# Patient Record
Sex: MALE | Race: BLACK OR AFRICAN AMERICAN | URBAN - METROPOLITAN AREA
[De-identification: names, ages, dates, MRNs, and addresses within clinical notes are randomized per-mention and may not be internally consistent; named-entity substitution may affect disease eponyms.]

---

## 2017-02-25 ENCOUNTER — EMERGENCY (EMERGENCY)
Facility: HOSPITAL | Age: 27
LOS: 0 days | Discharge: HOME | End: 2017-02-25

## 2017-06-27 DIAGNOSIS — H57.11 OCULAR PAIN, RIGHT EYE: ICD-10-CM

## 2017-06-27 DIAGNOSIS — Z87.891 PERSONAL HISTORY OF NICOTINE DEPENDENCE: ICD-10-CM

## 2017-11-30 ENCOUNTER — EMERGENCY (EMERGENCY)
Facility: HOSPITAL | Age: 27
LOS: 0 days | Discharge: HOME | End: 2017-11-30
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
## 2017-11-30 DIAGNOSIS — S62.334A DISPLACED FRACTURE OF NECK OF FOURTH METACARPAL BONE, RIGHT HAND, INITIAL ENCOUNTER FOR CLOSED FRACTURE: ICD-10-CM

## 2017-11-30 DIAGNOSIS — M25.541 PAIN IN JOINTS OF RIGHT HAND: ICD-10-CM

## 2017-11-30 DIAGNOSIS — S62.336A DISPLACED FRACTURE OF NECK OF FIFTH METACARPAL BONE, RIGHT HAND, INITIAL ENCOUNTER FOR CLOSED FRACTURE: ICD-10-CM
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
## 2017-12-09 ENCOUNTER — EMERGENCY (EMERGENCY)
Facility: HOSPITAL | Age: 27
LOS: 0 days | Discharge: HOME | End: 2017-12-09

## 2017-12-09 DIAGNOSIS — Z87.891 PERSONAL HISTORY OF NICOTINE DEPENDENCE: ICD-10-CM

## 2017-12-09 DIAGNOSIS — M54.5 LOW BACK PAIN: ICD-10-CM
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
## 2017-12-09 DIAGNOSIS — S16.1XXA STRAIN OF MUSCLE, FASCIA AND TENDON AT NECK LEVEL, INITIAL ENCOUNTER: ICD-10-CM

## 2017-12-09 DIAGNOSIS — S39.012A STRAIN OF MUSCLE, FASCIA AND TENDON OF LOWER BACK, INITIAL ENCOUNTER: ICD-10-CM
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
## 2018-06-03 ENCOUNTER — EMERGENCY (EMERGENCY)
Facility: HOSPITAL | Age: 28
LOS: 0 days | Discharge: HOME | End: 2018-06-04
Attending: EMERGENCY MEDICINE | Admitting: EMERGENCY MEDICINE

## 2018-06-03 VITALS
TEMPERATURE: 99 F | DIASTOLIC BLOOD PRESSURE: 79 MMHG | SYSTOLIC BLOOD PRESSURE: 162 MMHG | HEART RATE: 113 BPM | RESPIRATION RATE: 20 BRPM | OXYGEN SATURATION: 100 %
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
## 2018-06-03 DIAGNOSIS — S09.90XA UNSPECIFIED INJURY OF HEAD, INITIAL ENCOUNTER: ICD-10-CM

## 2018-06-03 DIAGNOSIS — Z23 ENCOUNTER FOR IMMUNIZATION: ICD-10-CM
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
## 2018-06-03 DIAGNOSIS — R51 HEADACHE: ICD-10-CM
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
## 2018-06-03 DIAGNOSIS — S06.0X9A CONCUSSION WITH LOSS OF CONSCIOUSNESS OF UNSPECIFIED DURATION, INITIAL ENCOUNTER: ICD-10-CM
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
## 2018-06-03 LAB
ALBUMIN SERPL ELPH-MCNC: 4.6 G/DL — SIGNIFICANT CHANGE UP (ref 3.5–5.2)
ALP SERPL-CCNC: 148 U/L — HIGH (ref 30–115)
ALT FLD-CCNC: 26 U/L — SIGNIFICANT CHANGE UP (ref 0–41)
ANION GAP SERPL CALC-SCNC: 12 MMOL/L — SIGNIFICANT CHANGE UP (ref 7–14)
APTT BLD: 25.9 SEC — LOW (ref 27–39.2)
AST SERPL-CCNC: 23 U/L — SIGNIFICANT CHANGE UP (ref 0–41)
BASOPHILS # BLD AUTO: 0.03 K/UL — SIGNIFICANT CHANGE UP (ref 0–0.2)
BASOPHILS NFR BLD AUTO: 0.3 % — SIGNIFICANT CHANGE UP (ref 0–1)
BILIRUB SERPL-MCNC: 0.4 MG/DL — SIGNIFICANT CHANGE UP (ref 0.2–1.2)
BUN SERPL-MCNC: 11 MG/DL — SIGNIFICANT CHANGE UP (ref 10–20)
CALCIUM SERPL-MCNC: 9.3 MG/DL — SIGNIFICANT CHANGE UP (ref 8.5–10.1)
CHLORIDE SERPL-SCNC: 105 MMOL/L — SIGNIFICANT CHANGE UP (ref 98–110)
CO2 SERPL-SCNC: 25 MMOL/L — SIGNIFICANT CHANGE UP (ref 17–32)
CREAT SERPL-MCNC: 0.7 MG/DL — SIGNIFICANT CHANGE UP (ref 0.7–1.5)
EOSINOPHIL # BLD AUTO: 0.08 K/UL — SIGNIFICANT CHANGE UP (ref 0–0.7)
EOSINOPHIL NFR BLD AUTO: 0.8 % — SIGNIFICANT CHANGE UP (ref 0–8)
ETHANOL SERPL-MCNC: 111 MG/DL — HIGH
GLUCOSE SERPL-MCNC: 99 MG/DL — SIGNIFICANT CHANGE UP (ref 70–99)
HCT VFR BLD CALC: 42.2 % — SIGNIFICANT CHANGE UP (ref 42–52)
HGB BLD-MCNC: 14.3 G/DL — SIGNIFICANT CHANGE UP (ref 14–18)
IMM GRANULOCYTES NFR BLD AUTO: 0.7 % — HIGH (ref 0.1–0.3)
INR BLD: 1.15 RATIO — SIGNIFICANT CHANGE UP (ref 0.65–1.3)
LACTATE SERPL-SCNC: 3.5 MMOL/L — HIGH (ref 0.5–2.2)
LIDOCAIN IGE QN: 17 U/L — SIGNIFICANT CHANGE UP (ref 7–60)
LYMPHOCYTES # BLD AUTO: 4.11 K/UL — HIGH (ref 1.2–3.4)
LYMPHOCYTES # BLD AUTO: 41.5 % — SIGNIFICANT CHANGE UP (ref 20.5–51.1)
MCHC RBC-ENTMCNC: 28 PG — SIGNIFICANT CHANGE UP (ref 27–31)
MCHC RBC-ENTMCNC: 33.9 G/DL — SIGNIFICANT CHANGE UP (ref 32–37)
MCV RBC AUTO: 82.7 FL — SIGNIFICANT CHANGE UP (ref 80–94)
MONOCYTES # BLD AUTO: 0.63 K/UL — HIGH (ref 0.1–0.6)
MONOCYTES NFR BLD AUTO: 6.4 % — SIGNIFICANT CHANGE UP (ref 1.7–9.3)
NEUTROPHILS # BLD AUTO: 4.99 K/UL — SIGNIFICANT CHANGE UP (ref 1.4–6.5)
NEUTROPHILS NFR BLD AUTO: 50.3 % — SIGNIFICANT CHANGE UP (ref 42.2–75.2)
NRBC # BLD: 0 /100 WBCS — SIGNIFICANT CHANGE UP (ref 0–0)
PLATELET # BLD AUTO: 190 K/UL — SIGNIFICANT CHANGE UP (ref 130–400)
POTASSIUM SERPL-MCNC: 3.2 MMOL/L — LOW (ref 3.5–5)
POTASSIUM SERPL-SCNC: 3.2 MMOL/L — LOW (ref 3.5–5)
PROT SERPL-MCNC: 7.4 G/DL — SIGNIFICANT CHANGE UP (ref 6–8)
PROTHROM AB SERPL-ACNC: 12.5 SEC — SIGNIFICANT CHANGE UP (ref 9.95–12.87)
RBC # BLD: 5.1 M/UL — SIGNIFICANT CHANGE UP (ref 4.7–6.1)
RBC # FLD: 12.6 % — SIGNIFICANT CHANGE UP (ref 11.5–14.5)
SODIUM SERPL-SCNC: 142 MMOL/L — SIGNIFICANT CHANGE UP (ref 135–146)
WBC # BLD: 9.91 K/UL — SIGNIFICANT CHANGE UP (ref 4.8–10.8)
WBC # FLD AUTO: 9.91 K/UL — SIGNIFICANT CHANGE UP (ref 4.8–10.8)

## 2018-06-03 RX ORDER — TETANUS TOXOID, REDUCED DIPHTHERIA TOXOID AND ACELLULAR PERTUSSIS VACCINE, ADSORBED 5; 2.5; 8; 8; 2.5 [IU]/.5ML; [IU]/.5ML; UG/.5ML; UG/.5ML; UG/.5ML
0.5 SUSPENSION INTRAMUSCULAR ONCE
Qty: 0 | Refills: 0 | Status: COMPLETED | OUTPATIENT
Start: 2018-06-03 | End: 2018-06-03

## 2018-06-03 RX ORDER — SODIUM CHLORIDE 9 MG/ML
1000 INJECTION INTRAMUSCULAR; INTRAVENOUS; SUBCUTANEOUS ONCE
Qty: 0 | Refills: 0 | Status: COMPLETED | OUTPATIENT
Start: 2018-06-03 | End: 2018-06-03

## 2018-06-03 RX ORDER — POTASSIUM CHLORIDE 20 MEQ
40 PACKET (EA) ORAL ONCE
Qty: 0 | Refills: 0 | Status: COMPLETED | OUTPATIENT
Start: 2018-06-03 | End: 2018-06-03

## 2018-06-03 RX ORDER — ACETAMINOPHEN 500 MG
975 TABLET ORAL ONCE
Qty: 0 | Refills: 0 | Status: DISCONTINUED | OUTPATIENT
Start: 2018-06-03 | End: 2018-06-04

## 2018-06-03 RX ADMIN — SODIUM CHLORIDE 1000 MILLILITER(S): 9 INJECTION INTRAMUSCULAR; INTRAVENOUS; SUBCUTANEOUS at 23:00
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
## 2018-06-03 NOTE — ED PROVIDER NOTE - CARE PLAN
Principal Discharge DX:	Head injury  Secondary Diagnosis:	Concussion  Secondary Diagnosis:	Fall down stairs
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
## 2018-06-03 NOTE — ED PROVIDER NOTE - NS ED ROS FT
Review of Systems    Constitutional: (-) fever  Eyes/ENT: (-) change in vision, (-) ear pain  Cardiovascular: (-) chest pain, (-) palpitation   Respiratory: (-) cough, (-) shortness of breath  Gastrointestinal: (-) abdominal pain (-) vomiting, (-) diarrhea  Musculoskeletal: (-) neck pain, (-) back pain, (-) joint pain  Integumentary: (-) rash, (-) edema  Neurological: (-) altered mental status  Heme/Lymph: (-) easy bruising (-) easy bleeding   Allergic/Immunologic: (-) pruritus
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
## 2018-06-04 VITALS
DIASTOLIC BLOOD PRESSURE: 74 MMHG | SYSTOLIC BLOOD PRESSURE: 144 MMHG | RESPIRATION RATE: 18 BRPM | HEART RATE: 114 BPM | TEMPERATURE: 99 F | OXYGEN SATURATION: 96 %

## 2018-06-04 RX ADMIN — Medication 40 MILLIEQUIVALENT(S): at 00:11

## 2018-06-04 RX ADMIN — TETANUS TOXOID, REDUCED DIPHTHERIA TOXOID AND ACELLULAR PERTUSSIS VACCINE, ADSORBED 0.5 MILLILITER(S): 5; 2.5; 8; 8; 2.5 SUSPENSION INTRAMUSCULAR at 00:11

## 2018-06-19 ENCOUNTER — OUTPATIENT (OUTPATIENT)
Dept: OUTPATIENT SERVICES | Facility: HOSPITAL | Age: 28
LOS: 1 days | Discharge: HOME | End: 2018-06-19

## 2018-06-19 DIAGNOSIS — Z20.6 CONTACT WITH AND (SUSPECTED) EXPOSURE TO HUMAN IMMUNODEFICIENCY VIRUS [HIV]: ICD-10-CM

## 2018-06-19 LAB
ANION GAP SERPL CALC-SCNC: 10 MMOL/L — SIGNIFICANT CHANGE UP (ref 7–14)
APPEARANCE UR: CLEAR — SIGNIFICANT CHANGE UP
BILIRUB UR-MCNC: NEGATIVE — SIGNIFICANT CHANGE UP
BUN SERPL-MCNC: 13 MG/DL — SIGNIFICANT CHANGE UP (ref 10–20)
CALCIUM SERPL-MCNC: 10.2 MG/DL — HIGH (ref 8.5–10.1)
CHLORIDE SERPL-SCNC: 105 MMOL/L — SIGNIFICANT CHANGE UP (ref 98–110)
CO2 SERPL-SCNC: 25 MMOL/L — SIGNIFICANT CHANGE UP (ref 17–32)
COLOR SPEC: YELLOW — SIGNIFICANT CHANGE UP
CREAT SERPL-MCNC: 0.6 MG/DL — LOW (ref 0.7–1.5)
DIFF PNL FLD: NEGATIVE — SIGNIFICANT CHANGE UP
GLUCOSE SERPL-MCNC: 93 MG/DL — SIGNIFICANT CHANGE UP (ref 70–99)
GLUCOSE UR QL: NEGATIVE MG/DL — SIGNIFICANT CHANGE UP
KETONES UR-MCNC: (no result)
LEUKOCYTE ESTERASE UR-ACNC: (no result)
NITRITE UR-MCNC: NEGATIVE — SIGNIFICANT CHANGE UP
PH UR: 5.5 — SIGNIFICANT CHANGE UP (ref 5–8)
POTASSIUM SERPL-MCNC: 4.6 MMOL/L — SIGNIFICANT CHANGE UP (ref 3.5–5)
POTASSIUM SERPL-SCNC: 4.6 MMOL/L — SIGNIFICANT CHANGE UP (ref 3.5–5)
PROT UR-MCNC: NEGATIVE MG/DL — SIGNIFICANT CHANGE UP
SODIUM SERPL-SCNC: 140 MMOL/L — SIGNIFICANT CHANGE UP (ref 135–146)
SP GR SPEC: 1.02 — SIGNIFICANT CHANGE UP (ref 1.01–1.03)
UROBILINOGEN FLD QL: 0.2 MG/DL — SIGNIFICANT CHANGE UP (ref 0.2–0.2)
WBC UR QL: SIGNIFICANT CHANGE UP /HPF

## 2018-06-20 LAB
HAV IGG SER QL IA: SIGNIFICANT CHANGE UP
HBV CORE AB SER-ACNC: SIGNIFICANT CHANGE UP
HBV SURFACE AB SER-ACNC: REACTIVE
HBV SURFACE AG SER-ACNC: SIGNIFICANT CHANGE UP
HCV AB S/CO SERPL IA: 0.13 S/CO — SIGNIFICANT CHANGE UP
HCV AB SERPL-IMP: SIGNIFICANT CHANGE UP
HIV 1+2 AB+HIV1 P24 AG SERPL QL IA: SIGNIFICANT CHANGE UP
HSV1 IGG SER-ACNC: 5.85 INDEX — HIGH
HSV1 IGG SERPL QL IA: POSITIVE
HSV2 IGG FLD-ACNC: 0.7 INDEX — SIGNIFICANT CHANGE UP
HSV2 IGG SERPL QL IA: NEGATIVE — SIGNIFICANT CHANGE UP
T PALLIDUM AB TITR SER: NEGATIVE — SIGNIFICANT CHANGE UP

## 2018-09-22 ENCOUNTER — EMERGENCY (EMERGENCY)
Facility: HOSPITAL | Age: 28
LOS: 0 days | Discharge: HOME | End: 2018-09-22
Attending: EMERGENCY MEDICINE | Admitting: EMERGENCY MEDICINE

## 2018-09-22 VITALS
OXYGEN SATURATION: 97 % | RESPIRATION RATE: 19 BRPM | SYSTOLIC BLOOD PRESSURE: 167 MMHG | DIASTOLIC BLOOD PRESSURE: 74 MMHG | HEART RATE: 121 BPM | TEMPERATURE: 100 F

## 2018-09-22 VITALS
SYSTOLIC BLOOD PRESSURE: 131 MMHG | OXYGEN SATURATION: 98 % | DIASTOLIC BLOOD PRESSURE: 65 MMHG | TEMPERATURE: 99 F | HEART RATE: 103 BPM | RESPIRATION RATE: 29 BRPM

## 2018-09-22 DIAGNOSIS — R10.13 EPIGASTRIC PAIN: ICD-10-CM

## 2018-09-22 DIAGNOSIS — R50.9 FEVER, UNSPECIFIED: ICD-10-CM

## 2018-09-22 DIAGNOSIS — E83.42 HYPOMAGNESEMIA: ICD-10-CM

## 2018-09-22 DIAGNOSIS — J40 BRONCHITIS, NOT SPECIFIED AS ACUTE OR CHRONIC: ICD-10-CM

## 2018-09-22 DIAGNOSIS — R11.2 NAUSEA WITH VOMITING, UNSPECIFIED: ICD-10-CM

## 2018-09-22 LAB
ALBUMIN SERPL ELPH-MCNC: 4.2 G/DL — SIGNIFICANT CHANGE UP (ref 3.5–5.2)
ALP SERPL-CCNC: 146 U/L — HIGH (ref 30–115)
ALT FLD-CCNC: 12 U/L — SIGNIFICANT CHANGE UP (ref 0–41)
ANION GAP SERPL CALC-SCNC: 12 MMOL/L — SIGNIFICANT CHANGE UP (ref 7–14)
AST SERPL-CCNC: 11 U/L — SIGNIFICANT CHANGE UP (ref 0–41)
BASE EXCESS BLDV CALC-SCNC: 3.2 MMOL/L — HIGH (ref -2–2)
BASOPHILS # BLD AUTO: 0.02 K/UL — SIGNIFICANT CHANGE UP (ref 0–0.2)
BASOPHILS NFR BLD AUTO: 0.2 % — SIGNIFICANT CHANGE UP (ref 0–1)
BILIRUB SERPL-MCNC: 0.7 MG/DL — SIGNIFICANT CHANGE UP (ref 0.2–1.2)
BUN SERPL-MCNC: 8 MG/DL — LOW (ref 10–20)
CA-I SERPL-SCNC: 1.31 MMOL/L — HIGH (ref 1.12–1.3)
CALCIUM SERPL-MCNC: 9.6 MG/DL — SIGNIFICANT CHANGE UP (ref 8.5–10.1)
CHLORIDE SERPL-SCNC: 103 MMOL/L — SIGNIFICANT CHANGE UP (ref 98–110)
CO2 SERPL-SCNC: 25 MMOL/L — SIGNIFICANT CHANGE UP (ref 17–32)
CREAT SERPL-MCNC: 0.7 MG/DL — SIGNIFICANT CHANGE UP (ref 0.7–1.5)
EOSINOPHIL # BLD AUTO: 0.14 K/UL — SIGNIFICANT CHANGE UP (ref 0–0.7)
EOSINOPHIL NFR BLD AUTO: 1.2 % — SIGNIFICANT CHANGE UP (ref 0–8)
GAS PNL BLDV: 138 MMOL/L — SIGNIFICANT CHANGE UP (ref 136–145)
GAS PNL BLDV: SIGNIFICANT CHANGE UP
GLUCOSE SERPL-MCNC: 107 MG/DL — HIGH (ref 70–99)
HCO3 BLDV-SCNC: 29 MMOL/L — SIGNIFICANT CHANGE UP (ref 22–29)
HCT VFR BLD CALC: 40 % — LOW (ref 42–52)
HCT VFR BLDA CALC: 42.7 % — SIGNIFICANT CHANGE UP (ref 34–44)
HGB BLD CALC-MCNC: 13.9 G/DL — LOW (ref 14–18)
HGB BLD-MCNC: 13.5 G/DL — LOW (ref 14–18)
IMM GRANULOCYTES NFR BLD AUTO: 0.3 % — SIGNIFICANT CHANGE UP (ref 0.1–0.3)
LACTATE BLDV-MCNC: 1.3 MMOL/L — SIGNIFICANT CHANGE UP (ref 0.5–1.6)
LIDOCAIN IGE QN: 10 U/L — SIGNIFICANT CHANGE UP (ref 7–60)
LYMPHOCYTES # BLD AUTO: 1.2 K/UL — SIGNIFICANT CHANGE UP (ref 1.2–3.4)
LYMPHOCYTES # BLD AUTO: 10.4 % — LOW (ref 20.5–51.1)
MAGNESIUM SERPL-MCNC: 1.7 MG/DL — LOW (ref 1.8–2.4)
MCHC RBC-ENTMCNC: 28.1 PG — SIGNIFICANT CHANGE UP (ref 27–31)
MCHC RBC-ENTMCNC: 33.8 G/DL — SIGNIFICANT CHANGE UP (ref 32–37)
MCV RBC AUTO: 83.2 FL — SIGNIFICANT CHANGE UP (ref 80–94)
MONOCYTES # BLD AUTO: 0.75 K/UL — HIGH (ref 0.1–0.6)
MONOCYTES NFR BLD AUTO: 6.5 % — SIGNIFICANT CHANGE UP (ref 1.7–9.3)
NEUTROPHILS # BLD AUTO: 9.44 K/UL — HIGH (ref 1.4–6.5)
NEUTROPHILS NFR BLD AUTO: 81.4 % — HIGH (ref 42.2–75.2)
NRBC # BLD: 0 /100 WBCS — SIGNIFICANT CHANGE UP (ref 0–0)
PCO2 BLDV: 47 MMHG — SIGNIFICANT CHANGE UP (ref 41–51)
PH BLDV: 7.4 — SIGNIFICANT CHANGE UP (ref 7.26–7.43)
PLATELET # BLD AUTO: 175 K/UL — SIGNIFICANT CHANGE UP (ref 130–400)
PO2 BLDV: 40 MMHG — SIGNIFICANT CHANGE UP (ref 20–40)
POTASSIUM BLDV-SCNC: 4.3 MMOL/L — SIGNIFICANT CHANGE UP (ref 3.3–5.6)
POTASSIUM SERPL-MCNC: 4.8 MMOL/L — SIGNIFICANT CHANGE UP (ref 3.5–5)
POTASSIUM SERPL-SCNC: 4.8 MMOL/L — SIGNIFICANT CHANGE UP (ref 3.5–5)
PROT SERPL-MCNC: 7.2 G/DL — SIGNIFICANT CHANGE UP (ref 6–8)
RBC # BLD: 4.81 M/UL — SIGNIFICANT CHANGE UP (ref 4.7–6.1)
RBC # FLD: 12.5 % — SIGNIFICANT CHANGE UP (ref 11.5–14.5)
SAO2 % BLDV: 78 % — SIGNIFICANT CHANGE UP
SODIUM SERPL-SCNC: 140 MMOL/L — SIGNIFICANT CHANGE UP (ref 135–146)
WBC # BLD: 11.58 K/UL — HIGH (ref 4.8–10.8)
WBC # FLD AUTO: 11.58 K/UL — HIGH (ref 4.8–10.8)

## 2018-09-22 RX ORDER — ALBUTEROL 90 UG/1
2 AEROSOL, METERED ORAL
Qty: 1 | Refills: 0
Start: 2018-09-22 | End: 2018-10-21

## 2018-09-22 RX ORDER — IPRATROPIUM/ALBUTEROL SULFATE 18-103MCG
3 AEROSOL WITH ADAPTER (GRAM) INHALATION ONCE
Qty: 0 | Refills: 0 | Status: COMPLETED | OUTPATIENT
Start: 2018-09-22 | End: 2018-09-22

## 2018-09-22 RX ORDER — KETOROLAC TROMETHAMINE 30 MG/ML
30 SYRINGE (ML) INJECTION ONCE
Qty: 0 | Refills: 0 | Status: DISCONTINUED | OUTPATIENT
Start: 2018-09-22 | End: 2018-09-22

## 2018-09-22 RX ORDER — METOCLOPRAMIDE HCL 10 MG
10 TABLET ORAL ONCE
Qty: 0 | Refills: 0 | Status: COMPLETED | OUTPATIENT
Start: 2018-09-22 | End: 2018-09-22

## 2018-09-22 RX ORDER — AZITHROMYCIN 500 MG/1
1 TABLET, FILM COATED ORAL
Qty: 1 | Refills: 0
Start: 2018-09-22 | End: 2018-09-26

## 2018-09-22 RX ORDER — MAGNESIUM SULFATE 500 MG/ML
2 VIAL (ML) INJECTION ONCE
Qty: 0 | Refills: 0 | Status: COMPLETED | OUTPATIENT
Start: 2018-09-22 | End: 2018-09-22

## 2018-09-22 RX ORDER — SODIUM CHLORIDE 9 MG/ML
2000 INJECTION, SOLUTION INTRAVENOUS ONCE
Qty: 0 | Refills: 0 | Status: COMPLETED | OUTPATIENT
Start: 2018-09-22 | End: 2018-09-22

## 2018-09-22 RX ADMIN — Medication 3 MILLILITER(S): at 11:24

## 2018-09-22 RX ADMIN — Medication 60 MILLIGRAM(S): at 11:24

## 2018-09-22 RX ADMIN — Medication 30 MILLIGRAM(S): at 11:24

## 2018-09-22 RX ADMIN — Medication 10 MILLIGRAM(S): at 11:27

## 2018-09-22 RX ADMIN — SODIUM CHLORIDE 1000 MILLILITER(S): 9 INJECTION, SOLUTION INTRAVENOUS at 11:23

## 2018-09-22 RX ADMIN — Medication 50 GRAM(S): at 12:16

## 2018-09-22 NOTE — ED PROVIDER NOTE - CARE PLAN
Assessment and plan of treatment:	a/p: concern for flu like/viral illness, r/o pna, treat mild asthma exac/bronchitis, will do labs, ekg, cxr, nebs, steroids, ivf, reglan/toradol, po trial, re-eval. pt is not perc neg 2/2 tachycardia but likely febrile (temp99.7) and H&P more consistent w URI than PE. wells low. benign nontender abd exam. H&P not consistent w acs, pe, dissection, esoph perf, tamponade, ptx, appendicitis. Principal Discharge DX:	Bronchitis  Assessment and plan of treatment:	a/p: concern for flu like/viral illness, r/o pna, treat mild asthma exac/bronchitis, will do labs, ekg, cxr, nebs, steroids, ivf, reglan/toradol, po trial, re-eval. pt is not perc neg 2/2 tachycardia but likely febrile (temp99.7) and H&P more consistent w URI than PE. wells low. benign nontender abd exam. H&P not consistent w acs, pe, dissection, esoph perf, tamponade, ptx, appendicitis. Principal Discharge DX:	Bronchitis  Assessment and plan of treatment:	a/p: concern for flu like/viral illness, r/o pna, treat mild asthma exac/bronchitis, will do labs, ekg, cxr, nebs, steroids, ivf, reglan/toradol, po trial, re-eval. pt is not perc neg 2/2 tachycardia but likely febrile (temp99.7) and H&P more consistent w URI than PE. wells low. benign nontender abd exam. H&P not consistent w acs, pe, dissection, esoph perf, tamponade, ptx, appendicitis.  Secondary Diagnosis:	Hypomagnesemia

## 2018-09-22 NOTE — ED PROVIDER NOTE - MEDICAL DECISION MAKING DETAILS
pt feels better, tolerating po, will dc home w albuterol, prednisone, zpack, f/u pmd 1-2 weeks,  strict return precautions provided.

## 2018-09-22 NOTE — ED PROVIDER NOTE - OBJECTIVE STATEMENT
27M asthma no prior hosp/intub, p/w 3 days sub fever, chills, body aches, prod cough clear sputum, sore throat, wheezing, chest tightness, sob. epigastric burning and multiple episodes nbnb emesis assoc. no d/c. no dysuria, freq, hematuria. 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 episode of epistaxis yesterday.

## 2018-09-22 NOTE — ED ADULT NURSE NOTE - NSIMPLEMENTINTERV_GEN_ALL_ED
Implemented All Universal Safety Interventions:  [place] to call system. Call bell, personal items and telephone within reach. Instruct patient to call for assistance. Room bathroom lighting operational. Non-slip footwear when patient is off stretcher. Physically safe environment: no spills, clutter or unnecessary equipment. Stretcher in lowest position, wheels locked, appropriate side rails in place.

## 2018-09-22 NOTE — ED PROVIDER NOTE - PLAN OF CARE
a/p: concern for flu like/viral illness, r/o pna, treat mild asthma exac/bronchitis, will do labs, ekg, cxr, nebs, steroids, ivf, reglan/toradol, po trial, re-eval. pt is not perc neg 2/2 tachycardia but likely febrile (temp99.7) and H&P more consistent w URI than PE. wells low. benign nontender abd exam. H&P not consistent w acs, pe, dissection, esoph perf, tamponade, ptx, appendicitis.

## 2018-09-22 NOTE — ED PROVIDER NOTE - PHYSICAL EXAMINATION
VITAL SIGNS: AFebrile, vital signs stable  CONSTITUTIONAL: Well-developed; well-nourished; in no acute distress.  SKIN: Skin exam is warm and dry, no acute rash.  HEAD: Normocephalic; atraumatic.  EYES: Pupils equal round reactive to light, Extraocular movements intact; conjunctiva and sclera clear.  ENT: No nasal discharge; airway clear. Moist mucus membranes.  NECK: Supple; non tender. No rigidity  CARD: Regular rate and rhythm. Normal S1, S2; no murmurs, gallops, or rubs.  RESP: mild diffuse wheezing bilat, speaking in full sentences, no resp distress  ABD: Abdomen soft; non-tender; non-distended;  no hepatosplenomegaly. No costovertebral angle tenderness.   EXT: Normal ROM. No clubbing, cyanosis or edema. No calf tenderness to palpation.  NEURO: Alert and oriented x 3. No focal deficits.  PSYCH: Cooperative, appropriate.

## 2018-09-22 NOTE — ED PROVIDER NOTE - NS ED ROS FT
Review of Systems:  	•	CONSTITUTIONAL - +fever, No diaphoresis, No weight change  	•	SKIN - No rash  	•	HEMATOLOGIC - No abnormal bleeding or bruising  	•	EYES - No eye pain, No blurred vision  	•	ENT - No change in hearing, +sore throat, No neck pain, No rhinorrhea, No ear pain  	•	RESPIRATORY - +shortness of breath, +cough  	•	CARDIAC - + chest pain, No palpitations  	•	GI - + abdominal pain, +nausea, +vomiting, No diarrhea, No constipation, No bright red blood per rectum or melena.                      •                 - No dysuria, frequency, hematuria.   	•	ENDO - No polydypsia, No polyuria, No heat/cold intolerance  	•	MUSCULOSKELETAL - No joint paint, No swelling, No back pain  	•	NEUROLOGIC - No numbness, No focal weakness, No headache, No dizziness
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
## 2018-10-14 ENCOUNTER — EMERGENCY (EMERGENCY)
Facility: HOSPITAL | Age: 28
LOS: 0 days | Discharge: HOME | End: 2018-10-14
Attending: EMERGENCY MEDICINE | Admitting: EMERGENCY MEDICINE

## 2018-10-14 VITALS
DIASTOLIC BLOOD PRESSURE: 116 MMHG | RESPIRATION RATE: 18 BRPM | HEART RATE: 88 BPM | OXYGEN SATURATION: 98 % | TEMPERATURE: 98 F | SYSTOLIC BLOOD PRESSURE: 176 MMHG

## 2018-10-14 VITALS — TEMPERATURE: 98 F | SYSTOLIC BLOOD PRESSURE: 158 MMHG | DIASTOLIC BLOOD PRESSURE: 62 MMHG | HEART RATE: 85 BPM

## 2018-10-14 DIAGNOSIS — R10.9 UNSPECIFIED ABDOMINAL PAIN: ICD-10-CM

## 2018-10-14 DIAGNOSIS — R10.11 RIGHT UPPER QUADRANT PAIN: ICD-10-CM

## 2018-10-14 DIAGNOSIS — Z79.51 LONG TERM (CURRENT) USE OF INHALED STEROIDS: ICD-10-CM

## 2018-10-14 DIAGNOSIS — Z79.2 LONG TERM (CURRENT) USE OF ANTIBIOTICS: ICD-10-CM

## 2018-10-14 DIAGNOSIS — Z79.52 LONG TERM (CURRENT) USE OF SYSTEMIC STEROIDS: ICD-10-CM

## 2018-10-14 LAB
ALBUMIN SERPL ELPH-MCNC: 4.3 G/DL — SIGNIFICANT CHANGE UP (ref 3.5–5.2)
ALP SERPL-CCNC: 147 U/L — HIGH (ref 30–115)
ALT FLD-CCNC: 20 U/L — SIGNIFICANT CHANGE UP (ref 0–41)
ANION GAP SERPL CALC-SCNC: 9 MMOL/L — SIGNIFICANT CHANGE UP (ref 7–14)
APPEARANCE UR: CLEAR — SIGNIFICANT CHANGE UP
AST SERPL-CCNC: 19 U/L — SIGNIFICANT CHANGE UP (ref 0–41)
BACTERIA # UR AUTO: ABNORMAL /HPF
BASOPHILS # BLD AUTO: 0.02 K/UL — SIGNIFICANT CHANGE UP (ref 0–0.2)
BASOPHILS NFR BLD AUTO: 0.4 % — SIGNIFICANT CHANGE UP (ref 0–1)
BILIRUB SERPL-MCNC: 0.6 MG/DL — SIGNIFICANT CHANGE UP (ref 0.2–1.2)
BILIRUB UR-MCNC: NEGATIVE — SIGNIFICANT CHANGE UP
BUN SERPL-MCNC: 13 MG/DL — SIGNIFICANT CHANGE UP (ref 10–20)
CALCIUM SERPL-MCNC: 9.9 MG/DL — SIGNIFICANT CHANGE UP (ref 8.5–10.1)
CHLORIDE SERPL-SCNC: 102 MMOL/L — SIGNIFICANT CHANGE UP (ref 98–110)
CO2 SERPL-SCNC: 30 MMOL/L — SIGNIFICANT CHANGE UP (ref 17–32)
COLOR SPEC: YELLOW — SIGNIFICANT CHANGE UP
CREAT SERPL-MCNC: 0.6 MG/DL — LOW (ref 0.7–1.5)
DIFF PNL FLD: NEGATIVE — SIGNIFICANT CHANGE UP
EOSINOPHIL # BLD AUTO: 0.15 K/UL — SIGNIFICANT CHANGE UP (ref 0–0.7)
EOSINOPHIL NFR BLD AUTO: 3.2 % — SIGNIFICANT CHANGE UP (ref 0–8)
GLUCOSE SERPL-MCNC: 94 MG/DL — SIGNIFICANT CHANGE UP (ref 70–99)
GLUCOSE UR QL: NEGATIVE MG/DL — SIGNIFICANT CHANGE UP
HCT VFR BLD CALC: 40.2 % — LOW (ref 42–52)
HGB BLD-MCNC: 13.5 G/DL — LOW (ref 14–18)
IMM GRANULOCYTES NFR BLD AUTO: 0 % — LOW (ref 0.1–0.3)
KETONES UR-MCNC: NEGATIVE — SIGNIFICANT CHANGE UP
LACTATE SERPL-SCNC: 0.8 MMOL/L — SIGNIFICANT CHANGE UP (ref 0.5–2.2)
LEUKOCYTE ESTERASE UR-ACNC: NEGATIVE — SIGNIFICANT CHANGE UP
LIDOCAIN IGE QN: 22 U/L — SIGNIFICANT CHANGE UP (ref 7–60)
LYMPHOCYTES # BLD AUTO: 2.66 K/UL — SIGNIFICANT CHANGE UP (ref 1.2–3.4)
LYMPHOCYTES # BLD AUTO: 57.6 % — HIGH (ref 20.5–51.1)
MCHC RBC-ENTMCNC: 28.1 PG — SIGNIFICANT CHANGE UP (ref 27–31)
MCHC RBC-ENTMCNC: 33.6 G/DL — SIGNIFICANT CHANGE UP (ref 32–37)
MCV RBC AUTO: 83.8 FL — SIGNIFICANT CHANGE UP (ref 80–94)
MONOCYTES # BLD AUTO: 0.42 K/UL — SIGNIFICANT CHANGE UP (ref 0.1–0.6)
MONOCYTES NFR BLD AUTO: 9.1 % — SIGNIFICANT CHANGE UP (ref 1.7–9.3)
NEUTROPHILS # BLD AUTO: 1.37 K/UL — LOW (ref 1.4–6.5)
NEUTROPHILS NFR BLD AUTO: 29.7 % — LOW (ref 42.2–75.2)
NITRITE UR-MCNC: POSITIVE
NRBC # BLD: 0 /100 WBCS — SIGNIFICANT CHANGE UP (ref 0–0)
PH UR: 7 — SIGNIFICANT CHANGE UP (ref 5–8)
PLATELET # BLD AUTO: 195 K/UL — SIGNIFICANT CHANGE UP (ref 130–400)
POTASSIUM SERPL-MCNC: 4.8 MMOL/L — SIGNIFICANT CHANGE UP (ref 3.5–5)
POTASSIUM SERPL-SCNC: 4.8 MMOL/L — SIGNIFICANT CHANGE UP (ref 3.5–5)
PROT SERPL-MCNC: 7.1 G/DL — SIGNIFICANT CHANGE UP (ref 6–8)
PROT UR-MCNC: NEGATIVE MG/DL — SIGNIFICANT CHANGE UP
RBC # BLD: 4.8 M/UL — SIGNIFICANT CHANGE UP (ref 4.7–6.1)
RBC # FLD: 12.9 % — SIGNIFICANT CHANGE UP (ref 11.5–14.5)
RBC CASTS # UR COMP ASSIST: ABNORMAL /HPF
SODIUM SERPL-SCNC: 141 MMOL/L — SIGNIFICANT CHANGE UP (ref 135–146)
SP GR SPEC: 1.02 — SIGNIFICANT CHANGE UP (ref 1.01–1.03)
UROBILINOGEN FLD QL: 1 MG/DL (ref 0.2–0.2)
WBC # BLD: 4.62 K/UL — LOW (ref 4.8–10.8)
WBC # FLD AUTO: 4.62 K/UL — LOW (ref 4.8–10.8)
WBC UR QL: SIGNIFICANT CHANGE UP /HPF

## 2018-10-14 RX ORDER — KETOROLAC TROMETHAMINE 30 MG/ML
30 SYRINGE (ML) INJECTION ONCE
Qty: 0 | Refills: 0 | Status: DISCONTINUED | OUTPATIENT
Start: 2018-10-14 | End: 2018-10-14

## 2018-10-14 RX ORDER — SODIUM CHLORIDE 9 MG/ML
1000 INJECTION INTRAMUSCULAR; INTRAVENOUS; SUBCUTANEOUS ONCE
Qty: 0 | Refills: 0 | Status: COMPLETED | OUTPATIENT
Start: 2018-10-14 | End: 2018-10-14

## 2018-10-14 RX ADMIN — SODIUM CHLORIDE 1000 MILLILITER(S): 9 INJECTION INTRAMUSCULAR; INTRAVENOUS; SUBCUTANEOUS at 13:29

## 2018-10-14 RX ADMIN — Medication 30 MILLIGRAM(S): at 13:29

## 2018-10-14 NOTE — ED PROVIDER NOTE - PLAN OF CARE
a/p: R Flank pain, atraumatic, r/o renal colic, bedside sono neg for cholecystitis and AAA, will do labs, ua, ct, ivf, toradol, re-eval. consider MSK. H&P not consistent w shingles, dissection, fracture, cord compression, pyelo, uti, epidural abscess.

## 2018-10-14 NOTE — ED PROVIDER NOTE - NS ED ROS FT
•	CONSTITUTIONAL - No fever, No diaphoresis, No weight change  		•	SKIN - No rash  		•	HEMATOLOGIC - No abnormal bleeding or bruising  		•	EYES - No eye pain, No blurred vision  		•	ENT - No change in hearing, No sore throat, No neck pain, No rhinorrhea, No ear pain  		•	RESPIRATORY - No shortness of breath, No cough  		•	CARDIAC -No chest pain, No palpitations  		•	GI - No abdominal pain, No nausea, No vomiting, No diarrhea, No constipation, No bright red blood per rectum or melena. +flank pain   	                   •                 - No dysuria, frequency, hematuria.   		•	ENDO - No polydypsia, No polyuria, No heat/cold intolerance  		•	MUSCULOSKELETAL - No joint paint, No swelling, No back pain  	•	NEUROLOGIC - No numbness, No focal weakness, No headache, No dizziness

## 2018-10-14 NOTE — ED ADULT NURSE NOTE - WEIGHT IN KG
HPI     Eye Exam    Additional comments: Yearly           Comments   NP to [facility]   Has noticed some changes in vision since last exam  Wears PAL glasses last updated 3 years ago.  Wears OTC Readers +2.50-+3.00 PRN  No other complaints   No drops       Last edited by Silvia Espino, PCT on 6/12/2018 10:19 AM. (History)              Assessment /Plan     For exam results, see Encounter Report.    Encounter for examination of eyes and vision without abnormal findings    Glaucoma screening    Presbyopia      Glaucoma screening and fundus exam normal.  Current glasses are small and cutting off the multifocal.  A larger (deeper) frame is recommended.  Updated glasses prescription.  Return to clinic 1 yr.                    69.4

## 2018-10-14 NOTE — ED PROVIDER NOTE - OBJECTIVE STATEMENT
27M no pmh/psh, p/w R flank pain. present x 2 days, worse today, intermittent squeezing/sharp pain, nonradiating. no fever, chills. no trauma. no nvdc. no abd pain. no dysuria, freq, hematuria. no cp, sob. no numbness, weakness, tingling. no bowel/bladder incontinence/retention. no rash. pain is worse w movement. 27M no pmh/psh, p/w R flank pain. present x 2 days, worse today, intermittent squeezing/sharp pain, nonradiating. no fever, chills. no trauma. no nvdc. no abd pain. no dysuria, freq, hematuria. no cp, sob. no numbness, weakness, tingling. no bowel/bladder incontinence/retention. no rash. pain is worse w movement. No testicular pain.

## 2018-10-14 NOTE — ED PROVIDER NOTE - MEDICAL DECISION MAKING DETAILS
pt states he feels "way better", requesting dc home to  his kids, abd soft ntnd, no cvat, no testicular pain, no dysuria, will f/u ur cx given +nitrites, bedside ruq sono neg, no AAA on sono, f./u 1-2 weeks pmd, strict return precautions provided. pt informed of hip oa, degen disc dz

## 2018-10-14 NOTE — ED ADULT NURSE NOTE - OBJECTIVE STATEMENT
Patient presents with nonradiating right flank pain. Patient denies any N/V/D. Patient states he has been experiencing increased urination but denies any burning or discharge.

## 2018-10-14 NOTE — ED PROVIDER NOTE - CARE PLAN
Principal Discharge DX:	Flank pain Principal Discharge DX:	Flank pain  Assessment and plan of treatment:	a/p: R Flank pain, atraumatic, r/o renal colic, bedside sono neg for cholecystitis and AAA, will do labs, ua, ct, ivf, toradol, re-eval. consider MSK. H&P not consistent w shingles, dissection, fracture, cord compression, pyelo, uti, epidural abscess.

## 2018-10-14 NOTE — ED ADULT NURSE NOTE - NSIMPLEMENTINTERV_GEN_ALL_ED
Implemented All Universal Safety Interventions:  [place] to call system. Call bell, personal items and telephone within reach. Instruct patient to call for assistance. Room bathroom lighting operational. Non-slip footwear when patient is off stretcher. Physically safe environment: no spills, clutter or unnecessary equipment. Stretcher in lowest position, wheels locked, appropriate side rails in place.

## 2018-10-14 NOTE — ED PROVIDER NOTE - PHYSICAL EXAMINATION
VITAL SIGNS: AFebrile, vital signs stable  CONSTITUTIONAL: Well-developed; well-nourished; in no acute distress.  SKIN: Skin exam is warm and dry, no acute rash.  HEAD: Normocephalic; atraumatic.  EYES: Pupils equal round reactive to light, Extraocular movements intact; conjunctiva and sclera clear.  ENT: No nasal discharge; airway clear. Moist mucus membranes.  NECK: Supple; non tender. No rigidity  CARD: Regular rate and rhythm. Normal S1, S2; no murmurs, gallops, or rubs.  RESP: Lungs clear to auscultation bilaterally. No wheezes, rales or rhonchi.  ABD: Abdomen soft; non-tender; non-distended;  no hepatosplenomegaly. +R costovertebral angle tenderness. No rash  EXT: Normal ROM. No clubbing, cyanosis or edema. No calf tenderness to palpation.  NEURO: aaox3, CN 2-12 intact, No nystagmus.  5/5 motor x 4 ext, SILT x 4 extremities, No facial droop or slurred speech. No midline C/T/L tenderness to palpation or step off. Normal gait, No ataxia. No saddle anesthesia. +EHL intact bilat.   PSYCH: Cooperative, appropriate.

## 2018-10-15 ENCOUNTER — EMERGENCY (EMERGENCY)
Facility: HOSPITAL | Age: 28
LOS: 0 days | Discharge: HOME | End: 2018-10-15
Attending: EMERGENCY MEDICINE | Admitting: EMERGENCY MEDICINE

## 2018-10-15 VITALS
DIASTOLIC BLOOD PRESSURE: 94 MMHG | OXYGEN SATURATION: 99 % | TEMPERATURE: 97 F | SYSTOLIC BLOOD PRESSURE: 147 MMHG | HEART RATE: 86 BPM | RESPIRATION RATE: 18 BRPM

## 2018-10-15 DIAGNOSIS — Z79.2 LONG TERM (CURRENT) USE OF ANTIBIOTICS: ICD-10-CM

## 2018-10-15 DIAGNOSIS — Z79.899 OTHER LONG TERM (CURRENT) DRUG THERAPY: ICD-10-CM

## 2018-10-15 DIAGNOSIS — M54.5 LOW BACK PAIN: ICD-10-CM

## 2018-10-15 DIAGNOSIS — Z79.52 LONG TERM (CURRENT) USE OF SYSTEMIC STEROIDS: ICD-10-CM

## 2018-10-15 LAB
APPEARANCE UR: CLEAR — SIGNIFICANT CHANGE UP
BILIRUB UR-MCNC: NEGATIVE — SIGNIFICANT CHANGE UP
COLOR SPEC: YELLOW — SIGNIFICANT CHANGE UP
CULTURE RESULTS: NO GROWTH — SIGNIFICANT CHANGE UP
DIFF PNL FLD: NEGATIVE — SIGNIFICANT CHANGE UP
GLUCOSE UR QL: NEGATIVE MG/DL — SIGNIFICANT CHANGE UP
KETONES UR-MCNC: NEGATIVE — SIGNIFICANT CHANGE UP
LEUKOCYTE ESTERASE UR-ACNC: NEGATIVE — SIGNIFICANT CHANGE UP
NITRITE UR-MCNC: NEGATIVE — SIGNIFICANT CHANGE UP
PH UR: 6.5 — SIGNIFICANT CHANGE UP (ref 5–8)
PROT UR-MCNC: NEGATIVE MG/DL — SIGNIFICANT CHANGE UP
SP GR SPEC: 1.01 — SIGNIFICANT CHANGE UP (ref 1.01–1.03)
SPECIMEN SOURCE: SIGNIFICANT CHANGE UP
UROBILINOGEN FLD QL: 0.2 MG/DL — SIGNIFICANT CHANGE UP (ref 0.2–0.2)

## 2018-10-15 RX ORDER — OXYCODONE AND ACETAMINOPHEN 5; 325 MG/1; MG/1
2 TABLET ORAL EVERY 4 HOURS
Qty: 0 | Refills: 0 | Status: DISCONTINUED | OUTPATIENT
Start: 2018-10-15 | End: 2018-10-15

## 2018-10-15 RX ORDER — KETOROLAC TROMETHAMINE 30 MG/ML
60 SYRINGE (ML) INJECTION ONCE
Qty: 0 | Refills: 0 | Status: DISCONTINUED | OUTPATIENT
Start: 2018-10-15 | End: 2018-10-15

## 2018-10-15 RX ORDER — OXYCODONE AND ACETAMINOPHEN 5; 325 MG/1; MG/1
1 TABLET ORAL
Qty: 12 | Refills: 0
Start: 2018-10-15 | End: 2018-10-17

## 2018-10-15 RX ORDER — METHOCARBAMOL 500 MG/1
1000 TABLET, FILM COATED ORAL ONCE
Qty: 0 | Refills: 0 | Status: COMPLETED | OUTPATIENT
Start: 2018-10-15 | End: 2018-10-15

## 2018-10-15 RX ADMIN — OXYCODONE AND ACETAMINOPHEN 2 TABLET(S): 5; 325 TABLET ORAL at 13:58

## 2018-10-15 RX ADMIN — METHOCARBAMOL 1000 MILLIGRAM(S): 500 TABLET, FILM COATED ORAL at 12:42

## 2018-10-15 RX ADMIN — Medication 60 MILLIGRAM(S): at 12:42

## 2018-10-15 NOTE — ED PROVIDER NOTE - PROGRESS NOTE DETAILS
Pain improved, ambulatory in ED. UA clean catch today WNL. Impression is musculoskeletal etiology of symptoms, recommend analgesics, outpatient follow-up.  The patient was given detailed return precautions and advised to return to the emergency department in 2-3 days if not improving or sooner if any new symptoms develop, symptoms worsened or for any concerns. The patient was offered the opportunity to ask questions and verbalized that they understand the diagnosis and discharge instructions.

## 2018-10-15 NOTE — ED PROVIDER NOTE - MEDICAL DECISION MAKING DETAILS
I have fully discussed the medical management and delivery of care with the patient / guardian. I have discussed any available labs, imaging and treatment options with the patient / guardian and any diagnostic results supporting the patient's diagnosis. Please see progress notes and above notations for further mdm. Chart completed.

## 2018-10-15 NOTE — ED PROVIDER NOTE - NS ED ROS FT
Constitutional: No fever, chills.  Eyes: No visual changes, eye pain or discharge.  ENMT: No hearing changes, pain, discharge or infections.  Cardiac: No chest pain, SOB or edema.  Respiratory: No cough or respiratory distress.   GI: No nausea, vomiting, diarrhea or abdominal pain.  : No testicular pain, penile d/c, scrotal swelling, dysuria, frequency or burning.  Neuro: No headache or weakness. No LOC.  Skin: No skin rash.   Except as documented in the HPI, all other systems are negative.

## 2018-10-15 NOTE — ED PROVIDER NOTE - OBJECTIVE STATEMENT
27-year-old male denies significant past medical or past surgical history presents with right lower back pain for the past 3 days.  Patient states pain is constant, described as "crampy", denies radiation, worse with certain movements in position, better with rest in certain positions, reports recent heavy lifting and onset of pain on waking up the next morning, denies fever, tactile temp, chills, paresthesias, focal weakness, bowel or bladder dysfunction, urinary sx, LE weakness, saddle anesthesia, n/v/d, anorexia, respiratory sx or other associated complaints at present. The patient was seen in the emergency department yesterday which time he had normal labs, normal CT of the abdomen and pelvis, and an equivocal urinalysis, he states he was not treated for the abnormalities in the urine, when the pain persisted today despite ibuprofen at home he returned to the ED for further evaluation and management.

## 2018-10-15 NOTE — ED PROVIDER NOTE - PHYSICAL EXAMINATION
VSS, awake, alert, non-toxic appearing, lying comfortably in stretcher, in NAD, ears clear, no scleral icterus, oropharynx clear, mmm, no JVD or bruit, no jaundice, chest CTAB, non-labored breathing, no w/r/r, +S1/S2, RRR, no m/r/g, abdomen soft, NT, ND, +BS, no scars, hernias or distention, no pulsatile masses or bruits appreciated, no midline spinal tenderness, step-offs or deformities, no erythema, swelling or ecchymosis, no skin rash or lesions, no CVA tenderness, reproducible tenderness to palpation in the right lower lumbar region,no peripheral edema or deformities, b/l great toe, motor and sensation intact b/l LE, NV intact,  alert, clear speech and steady gait.

## 2018-10-15 NOTE — ED PROVIDER NOTE - NSFOLLOWUPINSTRUCTIONS_ED_ALL_ED_FT
Anemia    Anemia is a condition in which the concentration of red blood cells or hemoglobin in the blood is below normal. Hemoglobin is a substance in red blood cells that carries oxygen to the tissues of the body. Anemia results in not enough oxygen reaching these tissues which can cause symptoms such as weakness, dizziness/lightheadedness, shortness of breath, chest pain, paleness, or nausea.    SEEK IMMEDIATE MEDICAL CARE IF YOU HAVE THE FOLLOWING SYMPTOMS: extreme weakness/chest pain/shortness of breath, black or bloody stools, vomiting blood, fainting, fever, or any signs of dehydration.

## 2019-02-16 ENCOUNTER — EMERGENCY (EMERGENCY)
Facility: HOSPITAL | Age: 29
LOS: 0 days | Discharge: HOME | End: 2019-02-16
Admitting: PHYSICIAN ASSISTANT

## 2019-02-16 VITALS
RESPIRATION RATE: 18 BRPM | DIASTOLIC BLOOD PRESSURE: 74 MMHG | OXYGEN SATURATION: 100 % | SYSTOLIC BLOOD PRESSURE: 157 MMHG | HEART RATE: 100 BPM | TEMPERATURE: 99 F

## 2019-02-16 DIAGNOSIS — S01.312A LACERATION WITHOUT FOREIGN BODY OF LEFT EAR, INITIAL ENCOUNTER: ICD-10-CM
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
## 2019-02-16 DIAGNOSIS — Z79.52 LONG TERM (CURRENT) USE OF SYSTEMIC STEROIDS: ICD-10-CM

## 2019-02-16 DIAGNOSIS — T74.11XA ADULT PHYSICAL ABUSE, CONFIRMED, INITIAL ENCOUNTER: ICD-10-CM
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
## 2019-02-16 DIAGNOSIS — S40.812A ABRASION OF LEFT UPPER ARM, INITIAL ENCOUNTER: ICD-10-CM
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
## 2019-02-16 DIAGNOSIS — F17.210 NICOTINE DEPENDENCE, CIGARETTES, UNCOMPLICATED: ICD-10-CM

## 2019-02-16 DIAGNOSIS — S20.219A CONTUSION OF UNSPECIFIED FRONT WALL OF THORAX, INITIAL ENCOUNTER: ICD-10-CM

## 2019-02-16 DIAGNOSIS — S09.90XA UNSPECIFIED INJURY OF HEAD, INITIAL ENCOUNTER: ICD-10-CM

## 2019-02-16 RX ORDER — ACETAMINOPHEN 500 MG
975 TABLET ORAL ONCE
Qty: 0 | Refills: 0 | Status: COMPLETED | OUTPATIENT
Start: 2019-02-16 | End: 2019-02-16

## 2019-02-16 RX ADMIN — Medication 975 MILLIGRAM(S): at 14:57

## 2019-02-16 NOTE — ED PROVIDER NOTE - NSFOLLOWUPINSTRUCTIONS_ED_ALL_ED_FT
Closed Head Injury    A closed head injury is an injury to your head that may or may not involve a traumatic brain injury (TBI). Symptoms of TBI can be short or long lasting and include headache, dizziness, interference with memory or speech, fatigue, confusion, changes in sleep, mood changes, nausea, depression/anxiety, and dulling of senses. Make sure to obtain proper rest which includes getting plenty of sleep, avoiding excessive visual stimulation, and avoiding activities that may cause physical or mental stress. Avoid any situation where there is potential for another head injury, including sports.    SEEK IMMEDIATE MEDICAL CARE IF YOU HAVE ANY OF THE FOLLOWING SYMPTOMS: unusual drowsiness, vomiting, severe dizziness, seizures, lightheadedness, muscular weakness, different pupil sizes, visual changes, or clear or bloody discharge from your ears or nose.    Laceration    A laceration is a cut that goes through all of the layers of the skin and into the tissue that is right under the skin. Some lacerations heal on their own. Others need to be closed with skin adhesive strips, skin glue, stitches (sutures), or staples. Proper laceration care minimizes the risk of infection and helps the laceration to heal better.  If non-absorbable stitches or staples have been placed, they must be taken out within the time frame instructed by your healthcare provider.    SEEK IMMEDIATE MEDICAL CARE IF YOU HAVE ANY OF THE FOLLOWING SYMPTOMS: swelling around the wound, worsening pain, drainage from the wound, red streaking going away from your wound, inability to move finger or toe near the laceration, or discoloration of skin near the laceration.
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
## 2019-06-14 ENCOUNTER — EMERGENCY (EMERGENCY)
Facility: HOSPITAL | Age: 29
LOS: 0 days | Discharge: HOME | End: 2019-06-14
Admitting: EMERGENCY MEDICINE
Payer: MEDICAID

## 2019-06-14 VITALS
OXYGEN SATURATION: 100 % | RESPIRATION RATE: 18 BRPM | HEART RATE: 90 BPM | DIASTOLIC BLOOD PRESSURE: 72 MMHG | TEMPERATURE: 98 F | SYSTOLIC BLOOD PRESSURE: 121 MMHG

## 2019-06-14 DIAGNOSIS — M54.5 LOW BACK PAIN: ICD-10-CM

## 2019-06-14 DIAGNOSIS — F17.200 NICOTINE DEPENDENCE, UNSPECIFIED, UNCOMPLICATED: ICD-10-CM

## 2019-06-14 PROCEDURE — 99283 EMERGENCY DEPT VISIT LOW MDM: CPT

## 2019-06-14 RX ORDER — IBUPROFEN 200 MG
600 TABLET ORAL ONCE
Refills: 0 | Status: COMPLETED | OUTPATIENT
Start: 2019-06-14 | End: 2019-06-14

## 2019-06-14 RX ORDER — METHOCARBAMOL 500 MG/1
1000 TABLET, FILM COATED ORAL ONCE
Refills: 0 | Status: COMPLETED | OUTPATIENT
Start: 2019-06-14 | End: 2019-06-14

## 2019-06-14 RX ADMIN — Medication 600 MILLIGRAM(S): at 11:19

## 2019-06-14 RX ADMIN — METHOCARBAMOL 1000 MILLIGRAM(S): 500 TABLET, FILM COATED ORAL at 11:21

## 2019-06-14 NOTE — ED PROVIDER NOTE - PHYSICAL EXAMINATION
VITALS:  I have reviewed the initial vital signs.  GENERAL: Well-developed, well-nourished, in no acute distress.  HEENT: Sclera clear. Mucous membranes moist.  NECK: supple w FROM. No cervical paraspinal muscle ttp. No midline cervical spinous tenderness, step offs, or deformity.  CARDIO: RRR, nl S1 and S2. No murmurs, rubs, or gallops.  PULM: Normal effort. CTA b/l without wheezes, rales, or rhonchi.  MSK: Normal, steady gait w/o pain. No paraspinal muscle ttp. No midline thoracic or lumbar spinous tenderness, step offs, or deformity. No SI joint ttp or sciatic notch ttp.   GI: Abdomen soft and non-distended. Nontender in all four quadrants without rebound or guarding.  : No CVA tenderness b/l.  SKIN: Warm, dry. No pallor or rashes. Capillary refill <2 seconds.  NEURO: A&Ox3. Speech clear. 2+ patellar and achilles reflexes b/l. 5/5 strength to lower extremities b/l. Sensation intact and equal throughout.
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
## 2019-06-14 NOTE — ED PROVIDER NOTE - NSFOLLOWUPCLINICS_GEN_ALL_ED_FT
[facility]  Medicine  [address], [place]   Phone: [phone]  Fax:   Follow Up Time: 1-3 Days

## 2019-06-14 NOTE — ED ADULT TRIAGE NOTE - CHIEF COMPLAINT QUOTE
Called pt, left detailed vm (per consent to communicate) relaying MD message below. Asked pt to call back verify he received message.    Please order MRI of head, thanks.   lower back pain

## 2019-06-14 NOTE — ED PROVIDER NOTE - NS ED ROS FT
CONSTITUTIONAL: (-) fevers, (-) chills, (-) decreased appetite  NECK: (-) neck pain, (-) neck stiffness  CARDIO: (-) chest pain, (-) palpitations  PULM: (-) cough, (-) sputum, (-) shortness of breath  GI: (-) nausea, (-) vomiting, (-) diarrhea, (-) constipation, (-) abdominal pain, (-) bowel incontinence/retention  : (-) dysuria, (-) hematuria, (-) frequency, (-) urgency, (-) incontinence, (-) difficulty urinating, (-) urinary retention, (-) flank pain  MSK: see HPI, (-) myalgias, (-) gait difficulty  SKIN: (-) rashes, (-) wounds, (-) pallor, (-) ecchymosis  NEURO: (-) numbness, (-) weakness, (-) saddle paresthesias    *all other systems negative except as documented above and in the HPI*

## 2019-06-14 NOTE — ED ADULT NURSE NOTE - OBJECTIVE STATEMENT
pt comes in with complaints of mid back pain. pt has pain there normally but worsened. takes motrin and muscle relaxer. pt states he is feeling a little better then when first came in

## 2019-09-16 ENCOUNTER — EMERGENCY (EMERGENCY)
Facility: HOSPITAL | Age: 29
LOS: 0 days | Discharge: HOME | End: 2019-09-16
Attending: EMERGENCY MEDICINE | Admitting: EMERGENCY MEDICINE
Payer: SUBSIDIZED

## 2019-09-16 VITALS
HEART RATE: 109 BPM | WEIGHT: 162.04 LBS | RESPIRATION RATE: 18 BRPM | SYSTOLIC BLOOD PRESSURE: 149 MMHG | TEMPERATURE: 99 F | OXYGEN SATURATION: 109 % | DIASTOLIC BLOOD PRESSURE: 67 MMHG

## 2019-09-16 VITALS — HEART RATE: 88 BPM

## 2019-09-16 DIAGNOSIS — F17.200 NICOTINE DEPENDENCE, UNSPECIFIED, UNCOMPLICATED: ICD-10-CM

## 2019-09-16 DIAGNOSIS — R05 COUGH: ICD-10-CM

## 2019-09-16 DIAGNOSIS — J18.9 PNEUMONIA, UNSPECIFIED ORGANISM: ICD-10-CM

## 2019-09-16 LAB
ALBUMIN SERPL ELPH-MCNC: 4 G/DL — SIGNIFICANT CHANGE UP (ref 3.5–5.2)
ALP SERPL-CCNC: 121 U/L — HIGH (ref 30–115)
ALT FLD-CCNC: 15 U/L — SIGNIFICANT CHANGE UP (ref 0–41)
ANION GAP SERPL CALC-SCNC: 10 MMOL/L — SIGNIFICANT CHANGE UP (ref 7–14)
AST SERPL-CCNC: 12 U/L — SIGNIFICANT CHANGE UP (ref 0–41)
BASE EXCESS BLDV CALC-SCNC: 3.3 MMOL/L — HIGH (ref -2–2)
BASOPHILS # BLD AUTO: 0.01 K/UL — SIGNIFICANT CHANGE UP (ref 0–0.2)
BASOPHILS NFR BLD AUTO: 0.2 % — SIGNIFICANT CHANGE UP (ref 0–1)
BILIRUB SERPL-MCNC: 0.7 MG/DL — SIGNIFICANT CHANGE UP (ref 0.2–1.2)
BUN SERPL-MCNC: 10 MG/DL — SIGNIFICANT CHANGE UP (ref 10–20)
CA-I SERPL-SCNC: 1.24 MMOL/L — SIGNIFICANT CHANGE UP (ref 1.12–1.3)
CALCIUM SERPL-MCNC: 9.5 MG/DL — SIGNIFICANT CHANGE UP (ref 8.5–10.1)
CHLORIDE SERPL-SCNC: 103 MMOL/L — SIGNIFICANT CHANGE UP (ref 98–110)
CO2 SERPL-SCNC: 26 MMOL/L — SIGNIFICANT CHANGE UP (ref 17–32)
CREAT SERPL-MCNC: <0.5 MG/DL — LOW (ref 0.7–1.5)
EOSINOPHIL # BLD AUTO: 0.02 K/UL — SIGNIFICANT CHANGE UP (ref 0–0.7)
EOSINOPHIL NFR BLD AUTO: 0.3 % — SIGNIFICANT CHANGE UP (ref 0–8)
GAS PNL BLDV: 138 MMOL/L — SIGNIFICANT CHANGE UP (ref 136–145)
GAS PNL BLDV: SIGNIFICANT CHANGE UP
GLUCOSE SERPL-MCNC: 89 MG/DL — SIGNIFICANT CHANGE UP (ref 70–99)
HCO3 BLDV-SCNC: 29 MMOL/L — SIGNIFICANT CHANGE UP (ref 22–29)
HCT VFR BLD CALC: 35.5 % — LOW (ref 42–52)
HCT VFR BLDA CALC: 40.7 % — SIGNIFICANT CHANGE UP (ref 34–44)
HGB BLD CALC-MCNC: 13.3 G/DL — LOW (ref 14–18)
HGB BLD-MCNC: 11.8 G/DL — LOW (ref 14–18)
IMM GRANULOCYTES NFR BLD AUTO: 0.2 % — SIGNIFICANT CHANGE UP (ref 0.1–0.3)
LACTATE BLDV-MCNC: 0.8 MMOL/L — SIGNIFICANT CHANGE UP (ref 0.5–1.6)
LYMPHOCYTES # BLD AUTO: 1.52 K/UL — SIGNIFICANT CHANGE UP (ref 1.2–3.4)
LYMPHOCYTES # BLD AUTO: 24.6 % — SIGNIFICANT CHANGE UP (ref 20.5–51.1)
MCHC RBC-ENTMCNC: 27.7 PG — SIGNIFICANT CHANGE UP (ref 27–31)
MCHC RBC-ENTMCNC: 33.2 G/DL — SIGNIFICANT CHANGE UP (ref 32–37)
MCV RBC AUTO: 83.3 FL — SIGNIFICANT CHANGE UP (ref 80–94)
MONOCYTES # BLD AUTO: 0.77 K/UL — HIGH (ref 0.1–0.6)
MONOCYTES NFR BLD AUTO: 12.5 % — HIGH (ref 1.7–9.3)
NEUTROPHILS # BLD AUTO: 3.85 K/UL — SIGNIFICANT CHANGE UP (ref 1.4–6.5)
NEUTROPHILS NFR BLD AUTO: 62.2 % — SIGNIFICANT CHANGE UP (ref 42.2–75.2)
NRBC # BLD: 0 /100 WBCS — SIGNIFICANT CHANGE UP (ref 0–0)
PCO2 BLDV: 49 MMHG — SIGNIFICANT CHANGE UP (ref 41–51)
PH BLDV: 7.38 — SIGNIFICANT CHANGE UP (ref 7.26–7.43)
PLATELET # BLD AUTO: 179 K/UL — SIGNIFICANT CHANGE UP (ref 130–400)
PO2 BLDV: 25 MMHG — SIGNIFICANT CHANGE UP (ref 20–40)
POTASSIUM BLDV-SCNC: 3.7 MMOL/L — SIGNIFICANT CHANGE UP (ref 3.3–5.6)
POTASSIUM SERPL-MCNC: 4.2 MMOL/L — SIGNIFICANT CHANGE UP (ref 3.5–5)
POTASSIUM SERPL-SCNC: 4.2 MMOL/L — SIGNIFICANT CHANGE UP (ref 3.5–5)
PROT SERPL-MCNC: 6.6 G/DL — SIGNIFICANT CHANGE UP (ref 6–8)
RBC # BLD: 4.26 M/UL — LOW (ref 4.7–6.1)
RBC # FLD: 12.9 % — SIGNIFICANT CHANGE UP (ref 11.5–14.5)
SAO2 % BLDV: 42 % — SIGNIFICANT CHANGE UP
SODIUM SERPL-SCNC: 139 MMOL/L — SIGNIFICANT CHANGE UP (ref 135–146)
WBC # BLD: 6.18 K/UL — SIGNIFICANT CHANGE UP (ref 4.8–10.8)
WBC # FLD AUTO: 6.18 K/UL — SIGNIFICANT CHANGE UP (ref 4.8–10.8)

## 2019-09-16 PROCEDURE — 99284 EMERGENCY DEPT VISIT MOD MDM: CPT

## 2019-09-16 PROCEDURE — 71046 X-RAY EXAM CHEST 2 VIEWS: CPT | Mod: 26

## 2019-09-16 RX ORDER — IPRATROPIUM/ALBUTEROL SULFATE 18-103MCG
3 AEROSOL WITH ADAPTER (GRAM) INHALATION
Refills: 0 | Status: DISCONTINUED | OUTPATIENT
Start: 2019-09-16 | End: 2019-09-16

## 2019-09-16 RX ORDER — SODIUM CHLORIDE 9 MG/ML
1000 INJECTION, SOLUTION INTRAVENOUS ONCE
Refills: 0 | Status: COMPLETED | OUTPATIENT
Start: 2019-09-16 | End: 2019-09-16

## 2019-09-16 RX ADMIN — SODIUM CHLORIDE 1000 MILLILITER(S): 9 INJECTION, SOLUTION INTRAVENOUS at 20:50

## 2019-09-16 RX ADMIN — Medication 3 MILLILITER(S): at 21:00

## 2019-09-16 RX ADMIN — SODIUM CHLORIDE 1000 MILLILITER(S): 9 INJECTION, SOLUTION INTRAVENOUS at 21:30

## 2019-09-16 NOTE — ED PROVIDER NOTE - PATIENT PORTAL LINK FT
You can access the FollowMyHealth Patient Portal offered by [facility] by registering at the following website: [email or web address]. By joining [facility]’s FollowMyHealth portal, you will also be able to view your health information using other applications (apps) compatible with our system.

## 2019-09-16 NOTE — ED PROVIDER NOTE - PHYSICAL EXAMINATION
Constitutional: Well developed, well nourished. NAD.  Head: Normocephalic, atraumatic.  Eyes: PERRL. EOMI.  ENT: No nasal discharge. Mucous membranes moist.  Neck: Supple. Painless ROM.  Cardiovascular: Normal S1, S2. Regular rate and rhythm. No murmurs, rubs, or gallops.  Pulmonary: Normal respiratory rate and effort Scattered expiratory wheezing. decreased breath sounds in left base.  Abdominal: Soft. Nondistended. Nontender. No rebound, guarding, rigidity.  Extremities. Pelvis stable. No lower extremity edema, symmetric calves.  Skin: No rashes, cyanosis.  Neuro: AAOx3. No focal neurological deficits.  Psych: Normal mood. Normal affect.

## 2019-09-16 NOTE — ED PROVIDER NOTE - CLINICAL SUMMARY MEDICAL DECISION MAKING FREE TEXT BOX
29 y/o male with hx of PNA, presented to ED for cough, fever. LAbs, imaging obtained. PT with PNA on XR but vital and labs wnl. Low PSI score, will treat with outpt abx. Results reviewed and discussed with pt and printed for patient. Anticipatory guidance given including close outpatient followup. Strict return precautions given. Pt verbalizes understanding of and agrees with plan.

## 2019-09-16 NOTE — ED ADULT NURSE NOTE - NSIMPLEMENTINTERV_GEN_ALL_ED
Implemented All Universal Safety Interventions:  [place] to call system. Call bell, personal items and telephone within reach. Instruct patient to call for assistance. Room bathroom lighting operational. Non-slip footwear when patient is off stretcher. Physically safe environment: no spills, clutter or unnecessary equipment. Stretcher in lowest position, wheels locked, appropriate side rails in place.

## 2019-09-16 NOTE — ED PROVIDER NOTE - CARE PROVIDER_API CALL
Bry Morrison)  Critical Care Medicine; Geriatric Medicine; Internal Medicine; Pulmonary Disease  [address] [place] [address]  Phone: [phone]  Fax: [phone]  Follow Up Time: 1-3 Days
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
## 2019-09-16 NOTE — ED PROVIDER NOTE - NS ED ROS FT
Constitutional: +fever, chills.  Eyes: No visual changes.  ENT: No hearing changes. No sore throat.  Neck: No neck pain or stiffness.  Cardiovascular: No chest pain, palpitations, edema.  Pulmonary: +SOB, cough. No hemoptysis.  Abdominal: No abdominal pain, nausea, vomiting, diarrhea.  : No dysuria, frequency.  Neuro: No headache, syncope, dizziness.  MS: No back pain. No calf pain/swelling.  Psych: No suicidal ideations.

## 2019-12-04 NOTE — ED ADULT TRIAGE NOTE - MODE OF ARRIVAL
Discharge instructions have been reviewed with patient and present family. Copy given to patient. Pt verbalized understand of instructions and was given  the opportunity to ask questions and receive answers prior to leaving. Pt discharged with family and assisted out by RN in wheelchair. Private Vehicle

## 2020-03-16 ENCOUNTER — EMERGENCY (EMERGENCY)
Facility: HOSPITAL | Age: 30
LOS: 0 days | Discharge: LEFT BEFORE DISCHARGE | End: 2020-03-16
Admitting: EMERGENCY MEDICINE
Payer: MEDICAID

## 2020-03-16 DIAGNOSIS — Z53.21 PROCEDURE AND TREATMENT NOT CARRIED OUT DUE TO PATIENT LEAVING PRIOR TO BEING SEEN BY HEALTH CARE PROVIDER: ICD-10-CM

## 2020-03-16 PROCEDURE — L9992: CPT

## 2022-05-24 NOTE — ED ADULT NURSE NOTE - CAS DISCH ACCOMP BY
[NI] : Normal [de-identified] : b/l breast flaps soft and symmetrical\par skin paddles viable \par incisions c/d/i\par no palpable fluid collections or signs of infection [de-identified] : abdomen soft and non tender\par umbilicus with superficial epidermolysis and fibrin\par incisions c/d\par there is an incisional dehiscence measuring 3 cm x 1 cm with fibrin at base\par no palpable fluid collections or signs of infection Self

## 2022-09-14 NOTE — ED ADULT NURSE NOTE - NS ED NURSE DC INFO COMPLEXITY
Called Darrin and [facility] to see what form they talked about, we don't have any form. Saul from [address] and stated that they still need the disability form signed and faxed to them. Fax number she left was [phone]. Phone number: [phone]. Try contact Reford Aliya no one answered, called patient after and let he knows we already sent the requested to [facility] and I give the phone number too, and asked if he needs something else to call us back. Yanick from [employer]  [place] [address] called and left a voicemail stating they needed a call back at [phone]. Rj Johnson wanted to know the patients last appt and some other information but I couldn't understand him. Please Advise. The patient is a 68y Female complaining of fever. Verbalized Understanding/Moderate: Comprehensive teaching

## 2023-04-10 ENCOUNTER — TRANSCRIPTION ENCOUNTER (OUTPATIENT)
Age: 33
End: 2023-04-10

## 2023-04-10 ENCOUNTER — EMERGENCY (EMERGENCY)
Facility: HOSPITAL | Age: 33
LOS: 0 days | Discharge: HOME | End: 2023-04-10
Attending: EMERGENCY MEDICINE
Payer: MEDICAID

## 2023-04-10 VITALS
HEART RATE: 107 BPM | OXYGEN SATURATION: 98 % | SYSTOLIC BLOOD PRESSURE: 142 MMHG | TEMPERATURE: 98 F | WEIGHT: 173.94 LBS | DIASTOLIC BLOOD PRESSURE: 76 MMHG | RESPIRATION RATE: 14 BRPM

## 2023-04-10 DIAGNOSIS — I72.8 ANEURYSM OF OTHER SPECIFIED ARTERIES: ICD-10-CM

## 2023-04-10 DIAGNOSIS — F17.200 NICOTINE DEPENDENCE, UNSPECIFIED, UNCOMPLICATED: ICD-10-CM

## 2023-04-10 DIAGNOSIS — R11.2 NAUSEA WITH VOMITING, UNSPECIFIED: ICD-10-CM

## 2023-04-10 DIAGNOSIS — R10.13 EPIGASTRIC PAIN: ICD-10-CM

## 2023-04-10 DIAGNOSIS — Z86.59 PERSONAL HISTORY OF OTHER MENTAL AND BEHAVIORAL DISORDERS: ICD-10-CM

## 2023-04-10 DIAGNOSIS — I10 ESSENTIAL (PRIMARY) HYPERTENSION: ICD-10-CM

## 2023-04-10 LAB
ALBUMIN SERPL ELPH-MCNC: 3.8 G/DL — SIGNIFICANT CHANGE UP (ref 3.5–5.2)
ALP SERPL-CCNC: 117 U/L — HIGH (ref 30–115)
ALT FLD-CCNC: 12 U/L — SIGNIFICANT CHANGE UP (ref 0–41)
ANION GAP SERPL CALC-SCNC: 11 MMOL/L — SIGNIFICANT CHANGE UP (ref 7–14)
AST SERPL-CCNC: 13 U/L — SIGNIFICANT CHANGE UP (ref 0–41)
BASOPHILS # BLD AUTO: 0.01 K/UL — SIGNIFICANT CHANGE UP (ref 0–0.2)
BASOPHILS NFR BLD AUTO: 0.2 % — SIGNIFICANT CHANGE UP (ref 0–1)
BILIRUB SERPL-MCNC: <0.2 MG/DL — SIGNIFICANT CHANGE UP (ref 0.2–1.2)
BUN SERPL-MCNC: 8 MG/DL — LOW (ref 10–20)
CALCIUM SERPL-MCNC: 9.6 MG/DL — SIGNIFICANT CHANGE UP (ref 8.4–10.5)
CHLORIDE SERPL-SCNC: 107 MMOL/L — SIGNIFICANT CHANGE UP (ref 98–110)
CO2 SERPL-SCNC: 24 MMOL/L — SIGNIFICANT CHANGE UP (ref 17–32)
CREAT SERPL-MCNC: 0.5 MG/DL — LOW (ref 0.7–1.5)
EGFR: 139 ML/MIN/1.73M2 — SIGNIFICANT CHANGE UP
EOSINOPHIL # BLD AUTO: 0.07 K/UL — SIGNIFICANT CHANGE UP (ref 0–0.7)
EOSINOPHIL NFR BLD AUTO: 1.2 % — SIGNIFICANT CHANGE UP (ref 0–8)
GLUCOSE SERPL-MCNC: 93 MG/DL — SIGNIFICANT CHANGE UP (ref 70–99)
HCT VFR BLD CALC: 39.9 % — LOW (ref 42–52)
HGB BLD-MCNC: 13.5 G/DL — LOW (ref 14–18)
IMM GRANULOCYTES NFR BLD AUTO: 0.2 % — SIGNIFICANT CHANGE UP (ref 0.1–0.3)
LIDOCAIN IGE QN: 17 U/L — SIGNIFICANT CHANGE UP (ref 7–60)
LYMPHOCYTES # BLD AUTO: 1.95 K/UL — SIGNIFICANT CHANGE UP (ref 1.2–3.4)
LYMPHOCYTES # BLD AUTO: 33.2 % — SIGNIFICANT CHANGE UP (ref 20.5–51.1)
MCHC RBC-ENTMCNC: 29.3 PG — SIGNIFICANT CHANGE UP (ref 27–31)
MCHC RBC-ENTMCNC: 33.8 G/DL — SIGNIFICANT CHANGE UP (ref 32–37)
MCV RBC AUTO: 86.7 FL — SIGNIFICANT CHANGE UP (ref 80–94)
MONOCYTES # BLD AUTO: 0.41 K/UL — SIGNIFICANT CHANGE UP (ref 0.1–0.6)
MONOCYTES NFR BLD AUTO: 7 % — SIGNIFICANT CHANGE UP (ref 1.7–9.3)
NEUTROPHILS # BLD AUTO: 3.42 K/UL — SIGNIFICANT CHANGE UP (ref 1.4–6.5)
NEUTROPHILS NFR BLD AUTO: 58.2 % — SIGNIFICANT CHANGE UP (ref 42.2–75.2)
NRBC # BLD: 0 /100 WBCS — SIGNIFICANT CHANGE UP (ref 0–0)
PLATELET # BLD AUTO: 217 K/UL — SIGNIFICANT CHANGE UP (ref 130–400)
POTASSIUM SERPL-MCNC: 4.3 MMOL/L — SIGNIFICANT CHANGE UP (ref 3.5–5)
POTASSIUM SERPL-SCNC: 4.3 MMOL/L — SIGNIFICANT CHANGE UP (ref 3.5–5)
PROT SERPL-MCNC: 7 G/DL — SIGNIFICANT CHANGE UP (ref 6–8)
RBC # BLD: 4.6 M/UL — LOW (ref 4.7–6.1)
RBC # FLD: 12.8 % — SIGNIFICANT CHANGE UP (ref 11.5–14.5)
SODIUM SERPL-SCNC: 142 MMOL/L — SIGNIFICANT CHANGE UP (ref 135–146)
WBC # BLD: 5.87 K/UL — SIGNIFICANT CHANGE UP (ref 4.8–10.8)
WBC # FLD AUTO: 5.87 K/UL — SIGNIFICANT CHANGE UP (ref 4.8–10.8)

## 2023-04-10 PROCEDURE — ZZZZZ: CPT

## 2023-04-10 PROCEDURE — 83690 ASSAY OF LIPASE: CPT

## 2023-04-10 PROCEDURE — 74177 CT ABD & PELVIS W/CONTRAST: CPT | Mod: 26,MA

## 2023-04-10 PROCEDURE — 99284 EMERGENCY DEPT VISIT MOD MDM: CPT | Mod: 25

## 2023-04-10 PROCEDURE — 76705 ECHO EXAM OF ABDOMEN: CPT

## 2023-04-10 PROCEDURE — 99285 EMERGENCY DEPT VISIT HI MDM: CPT

## 2023-04-10 PROCEDURE — 82962 GLUCOSE BLOOD TEST: CPT

## 2023-04-10 PROCEDURE — 96374 THER/PROPH/DIAG INJ IV PUSH: CPT

## 2023-04-10 PROCEDURE — 85025 COMPLETE CBC W/AUTO DIFF WBC: CPT

## 2023-04-10 PROCEDURE — 76705 ECHO EXAM OF ABDOMEN: CPT | Mod: 26

## 2023-04-10 PROCEDURE — 96375 TX/PRO/DX INJ NEW DRUG ADDON: CPT

## 2023-04-10 PROCEDURE — 36415 COLL VENOUS BLD VENIPUNCTURE: CPT

## 2023-04-10 PROCEDURE — 80053 COMPREHEN METABOLIC PANEL: CPT

## 2023-04-10 PROCEDURE — 74177 CT ABD & PELVIS W/CONTRAST: CPT | Mod: MA

## 2023-04-10 RX ORDER — FAMOTIDINE 10 MG/ML
20 INJECTION INTRAVENOUS ONCE
Refills: 0 | Status: COMPLETED | OUTPATIENT
Start: 2023-04-10 | End: 2023-04-10

## 2023-04-10 RX ORDER — SODIUM CHLORIDE 9 MG/ML
1000 INJECTION, SOLUTION INTRAVENOUS ONCE
Refills: 0 | Status: COMPLETED | OUTPATIENT
Start: 2023-04-10 | End: 2023-04-10

## 2023-04-10 RX ORDER — ONDANSETRON 8 MG/1
4 TABLET, FILM COATED ORAL ONCE
Refills: 0 | Status: COMPLETED | OUTPATIENT
Start: 2023-04-10 | End: 2023-04-10

## 2023-04-10 RX ADMIN — ONDANSETRON 4 MILLIGRAM(S): 8 TABLET, FILM COATED ORAL at 18:06

## 2023-04-10 RX ADMIN — SODIUM CHLORIDE 1000 MILLILITER(S): 9 INJECTION, SOLUTION INTRAVENOUS at 18:06

## 2023-04-10 RX ADMIN — FAMOTIDINE 20 MILLIGRAM(S): 10 INJECTION INTRAVENOUS at 18:06

## 2023-04-10 NOTE — ED PROVIDER NOTE - OBJECTIVE STATEMENT
33 yo male, pmh of etoh abuse, presents to ed for abd pain, epigastric, a/w nv. Denies fever, chills, cp, sob, diarrhea, dysuria, hematuria.

## 2023-04-10 NOTE — ED PROVIDER NOTE - CARE PLAN
1 Principal Discharge DX:	Abdominal pain  Secondary Diagnosis:	SMAS (superior mesenteric artery syndrome)

## 2023-04-10 NOTE — ED PROVIDER NOTE - DIFFERENTIAL DIAGNOSIS
Differential Diagnosis Abdominal pain r/o obstruction, perforation, abscess, appendicitis, ischemia, hepatobiliary abnormality or any other emergent intra-abdominal pathology.

## 2023-04-10 NOTE — ED PROVIDER NOTE - PROGRESS NOTE DETAILS
gen surgery and vascular surgery aware of patient. per vascular pt can be discharge with outpt f/u. pt tolerating PO.

## 2023-04-10 NOTE — ED ADULT TRIAGE NOTE - CHIEF COMPLAINT QUOTE
pt sent from [facility] for RUQ abdominal pain with vomiting x 3 days hx of ETOH abuse last drink tequila 1 hr ago. fs 121

## 2023-04-10 NOTE — ED PROVIDER NOTE - CLINICAL SUMMARY MEDICAL DECISION MAKING FREE TEXT BOX
Patient presented with abdominal pain, N/V x several days. (+) tender on exam but otherwise afebrile, HD stable, well appearing. Obtained labs which were grossly unremarkable including no significant leukocytosis, anemia, signs of dehydration/ROMEO, transaminitis or significant electrolyte abnormalities. UA negative for infection. CT abd/pelvis showed (+) possible SMA syndrome but otherwise negative for emergent processes. Consulted vascular surgery who evaluated patient in the ED - no acute intervention at this time, recommending outpatient follow up. Patient felt much better after tx in ED, and serial abdominal exams benign. Able to tolerate PO. Given the above, will discharge home with outpatient follow up. Patient agreeable with plan. Agrees to return to ED for any new or worsening symptoms.

## 2023-04-10 NOTE — ED PROVIDER NOTE - PATIENT PORTAL LINK FT
You can access the FollowMyHealth Patient Portal offered by [facility] by registering at the following website: [email or web address]. By joining [employer]’s FollowMyHealth portal, you will also be able to view your health information using other applications (apps) compatible with our system.

## 2023-04-10 NOTE — ED PROVIDER NOTE - PHYSICAL EXAMINATION
Physical Exam    Vital Signs: I have reviewed the initial vital signs.  Constitutional: appears stated age, no acute distress  Eyes: Conjunctiva pink, Sclera clear,  Cardiovascular: S1 and S2, regular rate, regular rhythm, well-perfused extremities, radial pulses equal and 2+, pedal pulses 2+ and equal  Respiratory: unlabored respiratory effort, clear to auscultation bilaterally no wheezing, rales and rhonchi  Gastrointestinal: soft, + epigastric ttp, no pulsatile mass, normal bowl sounds  Musculoskeletal: supple neck, no lower extremity edema, no midline tenderness  Integumentary: warm, dry, no rash  Neurologic: awake, alert, nvi

## 2023-04-10 NOTE — CONSULT NOTE ADULT - SUBJECTIVE AND OBJECTIVE BOX
VASCULAR SURGERY CONSULT NOTE      HPI:  Patient is a 33 yo male with a pmhx of etoh abuse and HTN, presented to the ED for epigastric abdominal pain that started about 3 days ago. Patient states that this is the first occurrence of the pain and notes it to be throbbing/pulsating/aching on and off since it began and rates it s 7/10 with worst being 10/10 on pain scale. Patient states he initially presented to the urgent care that sent him here for rule out lynnette/pancreatitis. Patient denies sob, diarrhea, dysuria, hematuria.    Vascular consulted due to CT AP findings of SMA syndrome. Patient seen and examined at bedside. In no acute distress, making jokes, complains of the pain but says it is on and off. Abdominal exam only significant for epigastric pain and tenderness with rebound tenderness. No masses, pulsations noted. RUQ US noted possible cholecystitis. Recommend reviewing with surgery.      PAST MEDICAL & SURGICAL HISTORY:  No pertinent past medical history      No significant past surgical history        No Known Allergies    Home Medications:    No permtinent family history of PVD    REVIEW OF SYSTEMS:  GENERAL:                                         negative  SKIN:                                                 negative  OPTHALMOLOGIC:                          negative  ENMT:                                               negative  RESPIRATORY AND THORAX:        negative  CARDIOVASCULAR:                         negative  GASTROINTESTINAL: Positive for nausea, vomiting, diarrhea  NEPHROLOGY:                                  negative  MUSCULOSKELETAL:                       negative  NEUROLOGIC:                                   negative  PSYCHIATRIC:                                    negative  HEMATOLOGY/LYMPHATICS:         negative  ENDOCRINE:                                     negative  ALLERGIC/IMMUNOLOGIC:            negative    12 point ROS otherwise normal except as stated in HPI  FHx: none  SHX:  [ ]  smoking     [ x] alcohol use    PHYSICAL EXAM  Vital Signs Last 24 Hrs  T(C): 36.8 (10 Apr 2023 16:53), Max: 36.8 (10 Apr 2023 16:53)  T(F): 98.3 (10 Apr 2023 16:53), Max: 98.3 (10 Apr 2023 16:53)  HR: 107 (10 Apr 2023 16:53) (107 - 107)  BP: 142/76 (10 Apr 2023 16:53) (142/76 - 142/76)  RR: 14 (10 Apr 2023 16:53) (14 - 14)  SpO2: 98% (10 Apr 2023 16:53) (98% - 98%)    Parameters below as of 10 Apr 2023 16:53  Patient On (Oxygen Delivery Method): room air        Appearance: Normal	  HEENT:   Normal oral mucosa, PERRL, EOMI	  Neck: Supple, - JVD; Carotid Bruit   Cardiovascular: Normal S1 S2, No JVD, No murmurs,   Respiratory: Lungs clear to auscultation, No Rales, Rhonchi, Wheezing	  Gastrointestinal:  Soft, epigastric tenderness, positive BS, no masses or pulsations  Skin: No rashes, No ecchymoses, No cyanosis  Extremities: Normal range of motion, No clubbing, cyanosis or edema  Vascular: Peripheral pulses palpable 2+ bilaterally  Neurologic: Non-focal  Psychiatry: A & O x 3, Mood & affect appropriate      MEDICATIONS:   MEDICATIONS  (STANDING):    MEDICATIONS  (PRN):      LAB/STUDIES:                        13.5   5.87  )-----------( 217      ( 10 Apr 2023 17:34 )             39.9     04-10    142  |  107  |  8<L>  ----------------------------<  93  4.3   |  24  |  0.5<L>    Ca    9.6      10 Apr 2023 17:34    TPro  7.0  /  Alb  3.8  /  TBili  <0.2  /  DBili  x   /  AST  13  /  ALT  12  /  AlkPhos  117<H>  04-10      LIVER FUNCTIONS - ( 10 Apr 2023 17:34 )  Alb: 3.8 g/dL / Pro: 7.0 g/dL / ALK PHOS: 117 U/L / ALT: 12 U/L / AST: 13 U/L / GGT: x             IMAGING:  < from: CT Abdomen and Pelvis w/ IV Cont (04.10.23 @ 18:03) >  ACC: [id number] EXAM:  CT ABDOMEN AND PELVIS IC   ORDERED BY: CHUCK LITTLEJOHN     PROCEDURE DATE:  04/10/2023          INTERPRETATION:  CLINICAL HISTORY / REASON FOR EXAM: Epigastric pain.    TECHNIQUE: Contiguous axial CT images were obtained fromthe lower chest   to the pubic symphysis following the administration of 100 mL Omnipaque   350 intravenous contrast . Oral contrast was not administered. Coronal   and sagittal reformats were submitted for review. MIP reconstructions   were also submitted for review.    COMPARISON CT: CT abdomen and pelvis 10/14/2018    FINDINGS:    LOWER CHEST: There is mild bibasilar subsegmental atelectasis.    HEPATOBILIARY: Hypodensity measuring 1.3 cm in hepatic segment 2, stable   since prior study.    SPLEEN: Unremarkable.    PANCREAS: Unremarkable.    ADRENAL GLANDS: Unremarkable.    KIDNEYS: Symmetric renal enhancement. No hydronephrosis..    ABDOMINOPELVIC NODES: No enlarged abdominal or pelvic lymph nodes.    PELVIC ORGANS: Unremarkable.    PERITONEUM/MESENTERY/BOWEL: The duodenum is dilated up to 3.2 cm with an   abrupt caliber change at the third portion, where it traverses between   the aorta and superior mesenteric artery. The Aorto-SMA angle measures   approximately 15 degrees. No intraperitoneal free air or ascites. Normal   appendix.    BONES/SOFT TISSUES: No acute osseous abnormalities. Sclerotic focus in   the right femoral head, likely bone island.    VASCULAR: The abdominal aorta is of normal caliber..      IMPRESSION:    1.  Duodenum is dilated up to 3.2 cm with an abrupt caliber change at the   third portion, where it traverses between the aorta and superior   mesenteric artery. The Aorto-SMA angle measures approximately 15 degrees,   consistent with superior mesenteric artery syndrome.  2.  Stable hepatic hypodensity measuring 1.3 cm in hepatic segment 2.        Assessment:   Patient is a 33 yo male with a pmhx of etoh abuse and HTN, presented to the ED for epigastric abdominal pain that started about 3 days ago. Patient states that this is the first occurrence of the pain and notes it to be throbbing/pulsating/aching on and off since it began and rates it s 7/10 with worst being 10/10 on pain scale. Patient states he initially presented to the urgent care that sent him here for rule out lynnette/pancreatitis. Patient denies sob, diarrhea, dysuria, hematuria.    Plan:   - SMA syndrome noted on CT scan findings  - Discussed with vascular fellow, please f/u outpatient in 2 weeks with Dr. Ahn  - No vascular intervention warranted at this time  - Recommend surgery c/s for possible cholecystitis noted on RUQ US

## 2023-04-10 NOTE — ED ADULT NURSE NOTE - CHIEF COMPLAINT QUOTE
pt sent from [facility] for RUQ abdominal pain with vomiting x 3 days hx of ETOH abuse last drink tequila 1 hr ago. fs 121

## 2023-04-10 NOTE — ED PROVIDER NOTE - NSFOLLOWUPINSTRUCTIONS_ED_ALL_ED_FT
Please follow up with Dr Ahn and deep in 1-3 days    ******* Our Emergency Department Referral Coordinators will be reaching out to you in the next 24-48 hours from 9:00am to 5:00pm with a follow up appointment. Please expect a phone call from the hospital in that time frame. If you do not receive a call or if you have any questions or concerns, you can reach them at [phone] or [phone].    Acute Abdominal Pain    WHAT YOU NEED TO KNOW:    The cause of your abdominal pain may not be found. If a cause is found, treatment will depend on what the cause is.     DISCHARGE INSTRUCTIONS:    Return to the emergency department if:     You vomit blood or cannot stop vomiting.      You have blood in your bowel movement or it looks like tar.       You have bleeding from your rectum.       Your abdomen is larger than usual, more painful, and hard.       You have severe pain in your abdomen.       You stop passing gas and having bowel movements.       You feel weak, dizzy, or faint.    Contact your healthcare provider if:     You have a fever.      You have new signs and symptoms.      Your symptoms do not get better with treatment.       You have questions or concerns about your condition or care.    Medicines may be given to decrease pain, treat an infection, and manage your symptoms. Take your medicine as directed. Call your healthcare provider if you think your medicine is not helping or if you have side effects. Tell him if you are allergic to any medicine. Keep a list of the medicines, vitamins, and herbs you take. Include the amounts, and when and why you take them. Bring the list or the pill bottles to follow-up visits. Carry your medicine list with you in case of an emergency.    Manage your symptoms:     Apply heat on your abdomen for 20 to 30 minutes every 2 hours for as many days as directed. Heat helps decrease pain and muscle spasms.       Manage your stress. Stress may cause abdominal pain. Your healthcare provider may recommend relaxation techniques and deep breathing exercises to help decrease your stress. Your healthcare provider may recommend you talk to someone about your stress or anxiety, such as a counselor or a trusted friend. Get plenty of sleep and exercise regularly.       Limit or do not drink alcohol. Alcohol can make your abdominal pain worse. Ask your healthcare provider if it is safe for you to drink alcohol. Also ask how much is safe for you to drink.       Do not smoke. Nicotine and other chemicals in cigarettes can damage your esophagus and stomach. Ask your healthcare provider for information if you currently smoke and need help to quit. E-cigarettes or smokeless tobacco still contain nicotine. Talk to your healthcare provider before you use these products.     Make changes to the food you eat as directed: Do not eat foods that cause abdominal pain or other symptoms. Eat small meals more often.     Eat more high-fiber foods if you are constipated. High-fiber foods include fruits, vegetables, whole-grain foods, and legumes.       Do not eat foods that cause gas if you have bloating. Examples include broccoli, cabbage, and cauliflower. Do not drink soda or carbonated drinks, because these may also cause gas.       Do not eat foods or drinks that contain sorbitol or fructose if you have diarrhea and bloating. Some examples are fruit juices, candy, jelly, and sugar-free gum.       Do not eat high-fat foods, such as fried foods, cheeseburgers, hot dogs, and desserts.      Limit or do not drink caffeine. Caffeine may make symptoms, such as heart burn or nausea, worse.       Drink plenty of liquids to prevent dehydration from diarrhea or vomiting. Ask your healthcare provider how much liquid to drink each day and which liquids are best for you.     Follow up with your healthcare provider as directed: Write down your questions so you remember to ask them during your visits.       © Copyright [employer] 2019 All illustrations and images included in CareNotes are the copyrighted property of [employer] or [employer]

## 2023-06-21 ENCOUNTER — EMERGENCY (EMERGENCY)
Facility: HOSPITAL | Age: 33
LOS: 0 days | Discharge: HOME | End: 2023-06-21
Attending: STUDENT IN AN ORGANIZED HEALTH CARE EDUCATION/TRAINING PROGRAM
Payer: MEDICAID

## 2023-06-21 VITALS
DIASTOLIC BLOOD PRESSURE: 62 MMHG | WEIGHT: 171.96 LBS | OXYGEN SATURATION: 100 % | RESPIRATION RATE: 15 BRPM | HEIGHT: 73 IN | HEART RATE: 90 BPM | SYSTOLIC BLOOD PRESSURE: 156 MMHG | TEMPERATURE: 99 F

## 2023-06-21 DIAGNOSIS — K08.89 OTHER SPECIFIED DISORDERS OF TEETH AND SUPPORTING STRUCTURES: ICD-10-CM

## 2023-06-21 DIAGNOSIS — K02.9 DENTAL CARIES, UNSPECIFIED: ICD-10-CM

## 2023-06-21 PROCEDURE — 99283 EMERGENCY DEPT VISIT LOW MDM: CPT | Mod: 25

## 2023-06-21 PROCEDURE — 99283 EMERGENCY DEPT VISIT LOW MDM: CPT

## 2023-06-21 PROCEDURE — 96372 THER/PROPH/DIAG INJ SC/IM: CPT

## 2023-06-21 RX ORDER — KETOROLAC TROMETHAMINE 30 MG/ML
1 SYRINGE (ML) INJECTION
Qty: 12 | Refills: 0
Start: 2023-06-21 | End: 2023-06-24

## 2023-06-21 RX ORDER — AMOXICILLIN 250 MG/5ML
1 SUSPENSION, RECONSTITUTED, ORAL (ML) ORAL
Qty: 30 | Refills: 0
Start: 2023-06-21 | End: 2023-06-30

## 2023-06-21 RX ORDER — KETOROLAC TROMETHAMINE 30 MG/ML
60 SYRINGE (ML) INJECTION ONCE
Refills: 0 | Status: DISCONTINUED | OUTPATIENT
Start: 2023-06-21 | End: 2023-06-21

## 2023-06-21 RX ADMIN — Medication 60 MILLIGRAM(S): at 08:31

## 2023-06-21 NOTE — ED PROVIDER NOTE - OBJECTIVE STATEMENT
Patient with complaints of left lower toothache for 2 months but recently intensified.  Not much relief with ibuprofen.  Denies fever or chills or swelling.

## 2023-06-21 NOTE — ED PROVIDER NOTE - ATTENDING APP SHARED VISIT CONTRIBUTION OF CARE
33 yo m presents for L lower tooth pain x2 months. pain recently worsened prompting ed eval. no fevers/chills/swelling to mouth/tongue/jaw. no fevers. pt tolerating PO    vss  gen- NAD, aaox3  HENT- oropharynx clear, no drooling/stridor, tolerating secretions, normal voice, normal base of tongue, no facial swelling  Dental- TTP to tooth #17, no periapical abscess.   card-rrr  lungs-no resp distress, CTAB

## 2023-06-21 NOTE — ED PROVIDER NOTE - CLINICAL SUMMARY MEDICAL DECISION MAKING FREE TEXT BOX
dental pain  no airway complaints, pt nontoxic appearing w/o systemic complaints. pt tolerating secretions  will dc w/ rec for dental clinic f/u after discharge

## 2023-06-21 NOTE — ED PROVIDER NOTE - NSFOLLOWUPCLINICS_GEN_ALL_ED_FT
[facility]  Dental  [address] [place] [address]  Phone: [phone]  Fax:   Follow Up Time: 1-3 Days

## 2023-06-21 NOTE — ED PROVIDER NOTE - PHYSICAL EXAMINATION
CONST: Well appearing in NAD  EYES: PERRL, EOMI, Sclera and conjunctiva clear.   ENT: #17 is decayed and TTP. No swelling. Oropharynx normal appearing, no erythema or exudates. Uvula midline.  NECK: Non-tender, no meningeal signs  SKIN: Warm, dry, no acute rashes. Good turgor  NEURO: A&Ox3, No focal deficits. Strength 5/5 with no sensory deficits. Steady gait

## 2023-06-21 NOTE — ED ADULT NURSE NOTE - NSFALLUNIVINTERV_ED_ALL_ED
Bed/Stretcher in lowest position, wheels locked, appropriate side rails in place/Call bell, personal items and telephone in reach/Instruct patient to call for assistance before getting out of bed/chair/stretcher/Non-slip footwear applied when patient is off stretcher/[place] to call system/Physically safe environment - no spills, clutter or unnecessary equipment/Purposeful proactive rounding/Room/bathroom lighting operational, light cord in reach

## 2023-06-21 NOTE — ED PROVIDER NOTE - PATIENT PORTAL LINK FT
You can access the FollowMyHealth Patient Portal offered by [facility] by registering at the following website: [email or web address]. By joining [facility]’s FollowMyHealth portal, you will also be able to view your health information using other applications (apps) compatible with our system.

## 2023-10-19 NOTE — ED ADULT NURSE NOTE - CAS TRG GENERAL NORM CIRC DET
Gayla Rico is a 5 year old female presenting to the walk-in clinic today for: on Tuesday she rolled over her middle finger on left hand with a scooter during gym class. Per mom, pt's swelling has not gone down. Pt's mom did ice it and was given ibuprofen. Pt can bend finger, but doesn't want to use left hand much as she states it's painful. Of note, pt has a low grade fever, coughing and sneezing. Not lethargic, eating and drinking well per mom.    Swabs/Specimens collected during triage process:  None    BP greater than 140/90: No   Recheck completed: NA    PPE worn during room process  Writer: mask/gloves  Patient: n/a     Triaged to: room 37    Patient would like communication of their results via:    [facility]  
Splint placed on left middle finger per provide's orders. Writer discussed risks vs benefits with pt's mother and mother verbalized understanding. Pt verbalized comfort with splint. All questions answered and AVS reviewed.   
Strong peripheral pulses

## 2024-02-27 NOTE — ED ADULT NURSE NOTE - CCCP TRG CHIEF CMPLNT
Transitional Care Follow Up Visit  Subjective     Philomena Davis is a 61 y.o. female with ETOH cirrhosis, HTN, anxiety, insomnia who presents for a transitional care management visit.    Within 48 business hours after discharge our office contacted her via telephone to coordinate her care and needs.      I reviewed and discussed the details of that call along with the discharge summary, hospital problems, inpatient lab results, inpatient diagnostic studies, and consultation reports with Philomena.     Current outpatient and discharge medications have been reconciled for the patient.  Reviewed by: Unique Brandt DO          2/15/2024     7:23 PM   Date of TCM Phone Call   [facility]   Date of Admission 1/30/2024   Date of Discharge 2/15/2024   Discharge Disposition Home or Self Care     Risk for Readmission (LACE) Score: 10 (2/15/2024  6:00 AM)      GI Problem  The primary symptoms include fatigue. Primary symptoms do not include abdominal pain.   Insomnia  Associated symptoms include fatigue. Pertinent negatives include no abdominal pain.      Course During Hospital Stay:  Pt was hospitalized to [facility] 1/30-2/15 after she presented with profound weakness and hematemesis. EGD was performed and revealed severe esophagitis. She was also found to have atypical pneumonia on imaging. She was treated with Rocephin and Azith for pneumonia and supportively for the bleed.She was started on Protonix and BB therapy for variceal prophylaxis. She is to FU with GI again in 3 months for repeat colonoscopy and EGD.  Since discharge home she has been followed by home health, PT/OT. She unfortunately sustained a 5th metacarpal fracture prior to hospitalization and arm is now in a cast so she cannot drive. She does have follow up with Ortho in early March and is requesting transportation assistance for this. She has also been evicted from her current housing and doesn't know where she will be at the end of the  month.    Would also like to discuss sleep. Since being put on Coreg her BP has been lower and she is concerned about the clonidine that she has been taking for sleep as it has been causing her BP to drop. She would like to try alt agent. She does have appt with Sleep Medicine later this week.      The following portions of the patient's history were reviewed and updated as appropriate: allergies, current medications, past family history, past medical history, past social history, past surgical history, and problem list.    Review of Systems   Constitutional:  Positive for fatigue.   Gastrointestinal:  Negative for abdominal pain and blood in stool.   Psychiatric/Behavioral:  Positive for dysphoric mood and sleep disturbance. The patient has insomnia.        Objective   Physical Exam  HENT:      Head: Normocephalic.   Pulmonary:      Effort: Pulmonary effort is normal.   Neurological:      Mental Status: She is alert.   Psychiatric:         Thought Content: Thought content normal.         Assessment & Plan   Problems Addressed this Visit       Alcoholic cirrhosis of liver without ascites - Primary     Reviewed notes/documentation from recent hospitalization for GI bleed  Following w GI, needs repeat EGD and colonoscopy in 3 months (~May 2024)  Discussed importance of complete abstinence from alcohol  Cont BB, Protonix         Sleep disturbance     Uncontrolled  Unfortunately options are very limited given her hepatic impairment  Trial low dose Trazodone (has had over sedation before at higher doses)  Proceed w sleep medicine consult later this week         Relevant Medications    traZODone (DESYREL) 50 MG tablet    Alcohol use disorder    Closed fracture of left hand     Has FU with Ortho next month          Other Visit Diagnoses       Financial difficulties        Relevant Orders    Ambulatory Referral to Case Management Barriers to Care, Caregiving/Support    Assistance needed with transportation        Relevant  Orders    Ambulatory Referral to Case Management Barriers to Care, Caregiving/Support          Diagnoses         Codes Comments    Alcoholic cirrhosis of liver without ascites    -  Primary ICD-10-CM: K70.30  ICD-9-CM: 571.2     Alcohol use disorder     ICD-10-CM: F10.90  ICD-9-CM: V49.89     Sleep disturbance     ICD-10-CM: G47.9  ICD-9-CM: 780.50     Closed fracture of left hand with routine healing, subsequent encounter     ICD-10-CM: S62.92XD  ICD-9-CM: V54.12     Financial difficulties     ICD-10-CM: Z59.9  ICD-9-CM: V60.2     Assistance needed with transportation     ICD-10-CM: Z74.8  ICD-9-CM: V60.89             Unique Brandt DO             flu-like symptoms

## 2024-03-04 ENCOUNTER — INPATIENT (INPATIENT)
Facility: HOSPITAL | Age: 34
LOS: 0 days | Discharge: HOME | DRG: 201 | End: 2024-03-05
Attending: INTERNAL MEDICINE | Admitting: STUDENT IN AN ORGANIZED HEALTH CARE EDUCATION/TRAINING PROGRAM
Payer: MEDICAID

## 2024-03-04 VITALS
TEMPERATURE: 99 F | WEIGHT: 171.96 LBS | HEART RATE: 160 BPM | HEIGHT: 73 IN | SYSTOLIC BLOOD PRESSURE: 165 MMHG | RESPIRATION RATE: 18 BRPM | DIASTOLIC BLOOD PRESSURE: 94 MMHG | OXYGEN SATURATION: 97 %

## 2024-03-04 DIAGNOSIS — I48.91 UNSPECIFIED ATRIAL FIBRILLATION: ICD-10-CM

## 2024-03-04 LAB
ALBUMIN SERPL ELPH-MCNC: 4.4 G/DL — SIGNIFICANT CHANGE UP (ref 3.5–5.2)
ALP SERPL-CCNC: 123 U/L — HIGH (ref 30–115)
ALT FLD-CCNC: 33 U/L — SIGNIFICANT CHANGE UP (ref 0–41)
ANION GAP SERPL CALC-SCNC: 12 MMOL/L — SIGNIFICANT CHANGE UP (ref 7–14)
AST SERPL-CCNC: 33 U/L — SIGNIFICANT CHANGE UP (ref 0–41)
BASOPHILS # BLD AUTO: 0.02 K/UL — SIGNIFICANT CHANGE UP (ref 0–0.2)
BASOPHILS NFR BLD AUTO: 0.5 % — SIGNIFICANT CHANGE UP (ref 0–1)
BILIRUB SERPL-MCNC: 1.1 MG/DL — SIGNIFICANT CHANGE UP (ref 0.2–1.2)
BUN SERPL-MCNC: 12 MG/DL — SIGNIFICANT CHANGE UP (ref 10–20)
CALCIUM SERPL-MCNC: 9.9 MG/DL — SIGNIFICANT CHANGE UP (ref 8.4–10.4)
CHLORIDE SERPL-SCNC: 103 MMOL/L — SIGNIFICANT CHANGE UP (ref 98–110)
CO2 SERPL-SCNC: 25 MMOL/L — SIGNIFICANT CHANGE UP (ref 17–32)
CREAT SERPL-MCNC: 0.6 MG/DL — LOW (ref 0.7–1.5)
EGFR: 131 ML/MIN/1.73M2 — SIGNIFICANT CHANGE UP
EOSINOPHIL # BLD AUTO: 0.06 K/UL — SIGNIFICANT CHANGE UP (ref 0–0.7)
EOSINOPHIL NFR BLD AUTO: 1.5 % — SIGNIFICANT CHANGE UP (ref 0–8)
GLUCOSE SERPL-MCNC: 102 MG/DL — HIGH (ref 70–99)
HCT VFR BLD CALC: 45.8 % — SIGNIFICANT CHANGE UP (ref 42–52)
HGB BLD-MCNC: 15.7 G/DL — SIGNIFICANT CHANGE UP (ref 14–18)
IMM GRANULOCYTES NFR BLD AUTO: 0 % — LOW (ref 0.1–0.3)
LYMPHOCYTES # BLD AUTO: 2.13 K/UL — SIGNIFICANT CHANGE UP (ref 1.2–3.4)
LYMPHOCYTES # BLD AUTO: 51.6 % — HIGH (ref 20.5–51.1)
MCHC RBC-ENTMCNC: 29.5 PG — SIGNIFICANT CHANGE UP (ref 27–31)
MCHC RBC-ENTMCNC: 34.3 G/DL — SIGNIFICANT CHANGE UP (ref 32–37)
MCV RBC AUTO: 86.1 FL — SIGNIFICANT CHANGE UP (ref 80–94)
MONOCYTES # BLD AUTO: 0.52 K/UL — SIGNIFICANT CHANGE UP (ref 0.1–0.6)
MONOCYTES NFR BLD AUTO: 12.6 % — HIGH (ref 1.7–9.3)
NEUTROPHILS # BLD AUTO: 1.4 K/UL — SIGNIFICANT CHANGE UP (ref 1.4–6.5)
NEUTROPHILS NFR BLD AUTO: 33.8 % — LOW (ref 42.2–75.2)
NRBC # BLD: 0 /100 WBCS — SIGNIFICANT CHANGE UP (ref 0–0)
PLATELET # BLD AUTO: 157 K/UL — SIGNIFICANT CHANGE UP (ref 130–400)
PMV BLD: 12.8 FL — HIGH (ref 7.4–10.4)
POTASSIUM SERPL-MCNC: 4.6 MMOL/L — SIGNIFICANT CHANGE UP (ref 3.5–5)
POTASSIUM SERPL-SCNC: 4.6 MMOL/L — SIGNIFICANT CHANGE UP (ref 3.5–5)
PROT SERPL-MCNC: 7.6 G/DL — SIGNIFICANT CHANGE UP (ref 6–8)
RBC # BLD: 5.32 M/UL — SIGNIFICANT CHANGE UP (ref 4.7–6.1)
RBC # FLD: 13.3 % — SIGNIFICANT CHANGE UP (ref 11.5–14.5)
SODIUM SERPL-SCNC: 140 MMOL/L — SIGNIFICANT CHANGE UP (ref 135–146)
T4 AB SER-ACNC: 23.5 UG/DL — HIGH (ref 4.6–12)
TROPONIN T, HIGH SENSITIVITY RESULT: 7 NG/L — SIGNIFICANT CHANGE UP (ref 6–21)
TROPONIN T, HIGH SENSITIVITY RESULT: <6 NG/L — SIGNIFICANT CHANGE UP (ref 6–21)
TSH SERPL-MCNC: <0 UIU/ML — LOW (ref 0.27–4.2)
WBC # BLD: 4.13 K/UL — LOW (ref 4.8–10.8)
WBC # FLD AUTO: 4.13 K/UL — LOW (ref 4.8–10.8)

## 2024-03-04 PROCEDURE — 84484 ASSAY OF TROPONIN QUANT: CPT

## 2024-03-04 PROCEDURE — 36415 COLL VENOUS BLD VENIPUNCTURE: CPT

## 2024-03-04 PROCEDURE — 76937 US GUIDE VASCULAR ACCESS: CPT

## 2024-03-04 PROCEDURE — 93010 ELECTROCARDIOGRAM REPORT: CPT | Mod: 76

## 2024-03-04 PROCEDURE — 99291 CRITICAL CARE FIRST HOUR: CPT

## 2024-03-04 PROCEDURE — 71045 X-RAY EXAM CHEST 1 VIEW: CPT | Mod: 26

## 2024-03-04 PROCEDURE — 93005 ELECTROCARDIOGRAM TRACING: CPT

## 2024-03-04 PROCEDURE — 99223 1ST HOSP IP/OBS HIGH 75: CPT

## 2024-03-04 RX ORDER — SODIUM CHLORIDE 9 MG/ML
2000 INJECTION, SOLUTION INTRAVENOUS ONCE
Refills: 0 | Status: COMPLETED | OUTPATIENT
Start: 2024-03-04 | End: 2024-03-04

## 2024-03-04 RX ORDER — ENOXAPARIN SODIUM 100 MG/ML
40 INJECTION SUBCUTANEOUS EVERY 24 HOURS
Refills: 0 | Status: DISCONTINUED | OUTPATIENT
Start: 2024-03-04 | End: 2024-03-05

## 2024-03-04 RX ORDER — DILTIAZEM HCL 120 MG
90 CAPSULE, EXT RELEASE 24 HR ORAL ONCE
Refills: 0 | Status: COMPLETED | OUTPATIENT
Start: 2024-03-04 | End: 2024-03-04

## 2024-03-04 RX ORDER — DILTIAZEM HCL 120 MG
20 CAPSULE, EXT RELEASE 24 HR ORAL ONCE
Refills: 0 | Status: COMPLETED | OUTPATIENT
Start: 2024-03-04 | End: 2024-03-04

## 2024-03-04 RX ORDER — AMLODIPINE BESYLATE 2.5 MG/1
1 TABLET ORAL
Refills: 0 | DISCHARGE

## 2024-03-04 RX ORDER — METOPROLOL TARTRATE 50 MG
25 TABLET ORAL
Refills: 0 | Status: DISCONTINUED | OUTPATIENT
Start: 2024-03-04 | End: 2024-03-05

## 2024-03-04 RX ADMIN — Medication 90 MILLIGRAM(S): at 19:42

## 2024-03-04 RX ADMIN — SODIUM CHLORIDE 2000 MILLILITER(S): 9 INJECTION, SOLUTION INTRAVENOUS at 16:20

## 2024-03-04 RX ADMIN — Medication 20 MILLIGRAM(S): at 18:04

## 2024-03-04 NOTE — ED PROVIDER NOTE - CLINICAL SUMMARY MEDICAL DECISION MAKING FREE TEXT BOX
33-year-old man history of hyperthyroid although has not seen a specialist presents to the ED complaining of jaw pain patient found to be in new onset atrial flutter no chest pain.  Vital signs reviewed Labs imaging obtained and reviewed by me Dr. Sander Gandara given with improvement in rhythm patient admitted for new onset atrial flutter and untreated hyperthyroidism Appropriate medications for patients presenting complaints were offered and effects were re-assessed  Escalation to admission/observation was considered. At this time patient requires inpatient hospitalization.

## 2024-03-04 NOTE — H&P ADULT - ATTENDING COMMENTS
*In the event of discrepancy, my note supersedes the resident note.  Date seen: 3/4/24   Agree with HPI above. ROS negative except per HPI.   I reviewed and edited the physical exam above.   Pertinent labs and radiology reviewed and as above.    Assessment/Plan:  32yo M w/ pmhx of HTN and goiter that was not worked up presents to ED for left jaw pain. Admitted for hyperthyroidism and new onset atrial flutter.     New onset atrial flutter w/ RVR   - likely triggered by untreated hyperthyroidism   - ECG: atrial flutter w/ RVR, ; currently on tele: atrial flutter, more rate controlled between HR 70-80  - get TTE, get lipid profile/a1c/thyroid studies, serial ecgs  - s/p cardizem 20mg IVP x 1 and 90mg po x 1; start metoprolol tartrate 25mg q8hrs and titrate up as needed  - chadsvasc 1; hold off on a/c for now   - cardiology consult   - management of hyperthyroidism as below     Hyperthyroidism with goiter  - TSH undetectable; get T3, free T4, thyroid receptor antibody, thyroid stimulating immunoglobulin; ultimately needs thyroid scintigraphy to further assess goiter   - BB as above, may need to start methimazole while inpatient   - endocrine consult     HTN  - clarify home meds    DVT ppx: lovenox  Dispo: from home; thyroid panel and antibodies, TTE, endocrine consult, cardiology consult

## 2024-03-04 NOTE — H&P ADULT - NSHPPHYSICALEXAM_GEN_ALL_CORE
PHYSICAL EXAM:  GENERAL: NAD  EYES: conjunctiva and sclera clear  ENMT: No tonsillar erythema, exudates, or enlargement; Moist mucous membranes  NECK: large thyroid mass extending bilaterally, not tender, no palpable lymphadenopathy   HEART: Normal S1 S2, irregularly irregular rhythm    RESPIRATORY: Clear to auscultation bilaterally, resonant percussion note, no added sounds   ABDOMEN: Soft, Nontender, Nondistended; Bowel sounds present  NEUROLOGY: A&Ox3, grossly intact power and sensation   EXTREMITIES:  2+ Peripheral Pulses, No clubbing, cyanosis, or edema  SKIN: warm, dry, normal color, no rash or abnormal lesions PHYSICAL EXAM:  GENERAL: NAD  EYES: conjunctiva and sclera clear  ENMT: No tonsillar erythema, exudates, or enlargement; Moist mucous membranes  NECK: moderately enlarged goiter, not tender, no palpable lymphadenopathy   HEART: Normal S1 S2, irregularly irregular rhythm    RESPIRATORY: Clear to auscultation bilaterally, resonant percussion note, no added sounds   ABDOMEN: Soft, Nontender, Nondistended; Bowel sounds present  NEUROLOGY: A&Ox3, grossly intact power and sensation   EXTREMITIES:  2+ Peripheral Pulses, No clubbing, cyanosis, or edema  SKIN: warm, dry, normal color, no rash or abnormal lesions

## 2024-03-04 NOTE — ED ADULT NURSE NOTE - CHIEF COMPLAINT QUOTE
Pt c/o jaw tightness whenever chewing x 1 week. Pulse rate 160 bpm in triage. EKG showed Atrial Flutter @ 135-150 bpm. Pt denies chest pain, no shortness of breath. Pt states he feels his heart beating in his stomach when lying down. H/O hyperthyroid not on meds.

## 2024-03-04 NOTE — H&P ADULT - NSHPLABSRESULTS_GEN_ALL_CORE
15.7   4.13  )-----------( 157      ( 04 Mar 2024 16:20 )             45.8       03-04    140  |  103  |  12  ----------------------------<  102<H>  4.6   |  25  |  0.6<L>    Ca    9.9      04 Mar 2024 16:20    TPro  7.6  /  Alb  4.4  /  TBili  1.1  /  DBili  x   /  AST  33  /  ALT  33  /  AlkPhos  123<H>  03-04              Urinalysis Basic - ( 04 Mar 2024 16:20 )    Color: x / Appearance: x / SG: x / pH: x  Gluc: 102 mg/dL / Ketone: x  / Bili: x / Urobili: x   Blood: x / Protein: x / Nitrite: x   Leuk Esterase: x / RBC: x / WBC x   Sq Epi: x / Non Sq Epi: x / Bacteria: x            Lactate Trend            CAPILLARY BLOOD GLUCOSE

## 2024-03-04 NOTE — ED PROVIDER NOTE - OBJECTIVE STATEMENT
Patient is 33-year-old male history of hyperactive thyroid not on any medications has not followed up with specialist presents ED for evaluation of left-sided jaw pain.  In triage patient was found to be tachycardic to the 140s 150s EKG showed 3-1 a flutter.  Patient denies any chest pain trouble breathing or sensation of palpitations, no clear time when the palpitations started. Otherwise denies any fever, chills, headache, changes in vision, cough, congestion, n/v/d, abd pain, constipation, urinary complaints, lower extremity pain/swelling.

## 2024-03-04 NOTE — ED ADULT NURSE NOTE - OBJECTIVE STATEMENT
32 y/o male c/o jaw tightness whenever chewing x 1 week. pt HR found to be 160 in triage, denying any other complaints

## 2024-03-04 NOTE — H&P ADULT - ASSESSMENT
a 33-year-old male history of HTN, hyperactive thyroid not on any medications has not followed up with specialist presents ED for evaluation of left-sided jaw pain.      # New-onset Atrial flutter   # HTN   - On home amlodipine (will hold as he got Cardizem)  - S/P Cardizem drip, oral Cardizem in ED that controlled the rate   - Labs: unremarkable, negative troponin twice   - EKG showing Aflutter with variable block   - Start metoprolol 25X2 (added benefit with controlling hyperthyroid symptoms)  - TTE  - CHADS-VASC score of 1, no need for AC for now   - Telemetry monitoring   - Repeat EKG in AM  - Cardiology consult       # Hyperthyroidism   - Symptomatic  - Send thyroid profile  - US thyroid  - Endocrinology consult       # GI prophylaxis: none  # DVT prophylaxis: Lovenox   # Full code  # Diet: Regular

## 2024-03-04 NOTE — ED PROVIDER NOTE - CONSIDERATION OF ADMISSION OBSERVATION
Appropriate medications for patients presenting complaints were offered and effects were re-assessed  Escalation to admission/observation was considered. At this time patient requires inpatient hospitalization. Consideration of Admission/Observation

## 2024-03-04 NOTE — ED PROVIDER NOTE - PHYSICAL EXAMINATION
CONSTITUTIONAL: well-appearing, in NAD  SKIN: Warm dry, normal skin turgor  HEAD: NCAT  EYES: EOMI, PERRLA, no scleral icterus, conjunctiva pink  ENT: normal pharynx with no erythema or exudates  NECK: Supple; non tender. Full ROM.  CARD: tachycardia  RESP: clear to ausculation b/l. No crackles or wheezing.  ABD: soft, non-tender, non-distended, no rebound or guarding.  EXT: Full ROM, no bony tenderness, no pedal edema, no calf tenderness  NEURO: normal motor. normal sensory. CN II-XII intact. Cerebellar testing normal. Normal gait.  PSYCH: Cooperative, appropriate.

## 2024-03-04 NOTE — ED PROVIDER NOTE - INTERNATIONAL TRAVEL
Telephone Encounter by Bethany Solomon NCMA at 04/03/18 11:24 AM     Author:  Bethany Solomon NCMA Service:  (none) Author Type:  Certified Medical Assistant     Filed:  04/03/18 11:29 AM Encounter Date:  4/2/2018 Status:  Signed     :  Bethany Solomon NCMA (Certified Medical Assistant)            Left message on answering machine to call back.[MB1.1T]  Please clarify with patient if she will not be having MRI Brain with and without contrast at [facility]?  Would she like us to have order sent to [facility], just need to clarify as they keep calling for order and we were not aware if she would like to have testing at different location.[MB1.1M]      Revision History        User Key Date/Time User Provider Type Action    > MB1.1 04/03/18 11:29 AM Bethany Solomon NCMA Certified Medical Assistant Sign    M - Manual, T - Template             No

## 2024-03-04 NOTE — ED PROVIDER NOTE - PROGRESS NOTE DETAILS
Authored by Dr. Sander Nobles s/o received from dr. sheppard pending labs and reassessment. pk: pt converted with cardizem, labs reviewed, will place in med tele for further monitoring and management of afllutter and hyperthyroidism

## 2024-03-04 NOTE — ED ADULT TRIAGE NOTE - CHIEF COMPLAINT QUOTE
Pt c/o jaw tightness whenever chewing x 1 week Pt c/o jaw tightness whenever chewing x 1 week. Pulse rate 160 bpm in triage. EKG showed Atrial Flutter @ 135-150 bpm. Pt denies chest pain, no shortness of breath. Pt states he feels his heart beating in his stomach when lying down. H/O hyperthyroid not on meds.

## 2024-03-04 NOTE — H&P ADULT - HISTORY OF PRESENT ILLNESS
a 33-year-old male history of HTN, hyperactive thyroid not on any medications has not followed up with specialist presents ED for evaluation of left-sided jaw pain. The pain started on Friday associated with left facial swelling especially after eating, then it improves on it's own. He denied any fever, chills, difficulty swallowing. No tooth pain, no gingival problems. In triage patient was found to be tachycardic to the 140s 150s EKG showed 3-1 a flutter.  Patient denies any chest pain trouble breathing or sensation of palpitations. Otherwise denies any fever, chills, headache, changes in vision, cough, congestion, n/v/d, abd pain, constipation, urinary complaints, lower extremity pain/swelling.    He has chronic insomnia, weight loss, diarrhea. He had thyroid enlargement and was referred by his PCP to a specialist but he never followed up.    In ED Cardizem drip were started, rate controlled then switched to oral.     admitted to medicine for further management

## 2024-03-04 NOTE — H&P ADULT - TIME BILLING
Patient seen at bedside. Time spent evaluating and treating the patient's acute illness, reviewing labs, radiology, discussing with patient and/or patient's family, discussing the case with housestaff, and charting the encounter.

## 2024-03-04 NOTE — ED PROVIDER NOTE - ATTENDING CONTRIBUTION TO CARE
pt with pmh of hyperthyroid non compliant with follow up pw jaw pain, tachycardia, hypertension,    exam unremarkable,    rule out ischemia vs thyroid storm

## 2024-03-05 VITALS
HEART RATE: 76 BPM | RESPIRATION RATE: 18 BRPM | TEMPERATURE: 96 F | SYSTOLIC BLOOD PRESSURE: 135 MMHG | DIASTOLIC BLOOD PRESSURE: 76 MMHG | OXYGEN SATURATION: 97 %

## 2024-03-05 LAB
T3/T3 UPTAKE INDEX SERPL-RTO: 55 % — HIGH (ref 28–41)
T4/T3 UPTAKE INDEX SERPL: 0.3 TBI — LOW (ref 0.8–1.3)
TROPONIN T, HIGH SENSITIVITY RESULT: 7 NG/L — SIGNIFICANT CHANGE UP (ref 6–21)

## 2024-03-05 PROCEDURE — 93010 ELECTROCARDIOGRAM REPORT: CPT

## 2024-03-05 RX ORDER — METOPROLOL TARTRATE 50 MG
25 TABLET ORAL EVERY 8 HOURS
Refills: 0 | Status: DISCONTINUED | OUTPATIENT
Start: 2024-03-05 | End: 2024-03-05

## 2024-03-05 RX ADMIN — Medication 25 MILLIGRAM(S): at 06:40

## 2024-03-05 NOTE — CONSULT NOTE ADULT - SUBJECTIVE AND OBJECTIVE BOX
Patient is a 33y old  Male who presents with a chief complaint of Aflutter (04 Mar 2024 21:52)    HPI: Patient is a 33-year-old male history of HTN, hyperactive thyroid not on any medications has not followed up with specialist presents ED for evaluation of left-sided jaw pain. The pain started on Friday associated with left facial swelling especially after eating, then it improves on it's own. He denied any fever, chills, difficulty swallowing. No tooth pain, no gingival problems. In triage patient was found to be tachycardic to the 140s 150s EKG showed 3-1 a flutter.  Patient denies any chest pain trouble breathing or sensation of palpitations. Otherwise denies any fever, chills, headache, changes in vision, cough, congestion, n/v/d, abd pain, constipation, urinary complaints, lower extremity pain/swelling. He has chronic insomnia, weight loss, diarrhea. He had thyroid enlargement and was referred by his PCP to a specialist but he never followed up. In ED Cardizem drip were started, rate controlled then switched to oral.       PAST MEDICAL & SURGICAL HISTORY:  Hypertension      Atrial flutter      Hyperthyroidism      No significant past surgical history      PREVIOUS DIAGNOSTIC TESTING:      ECHO  FINDINGS:    STRESS  FINDINGS:    CATHETERIZATION  FINDINGS:    ELECTROPHYSIOLOGY STUDY  FINDINGS:    CAROTID ULTRASOUND:  FINDINGS    VENOUS DUPLEX SCAN:  FINDINGS:    CHEST CT PULMONARY ANGIO with IV Contrast:  FINDINGS:    MEDICATIONS  (STANDING):  enoxaparin Injectable 40 milliGRAM(s) SubCutaneous every 24 hours  metoprolol tartrate 25 milliGRAM(s) Oral every 8 hours    MEDICATIONS  (PRN):      FAMILY HISTORY: No significant hx    SOCIAL HISTORY: No smoking, ETOH or illicit drug use    Past Surgical History: see above    Allergies:  No Known Allergies      REVIEW OF SYSTEMS:  CONSTITUTIONAL: No fever, weight loss, chills, shakes, or fatigue  RESPIRATORY: No cough, wheezing, hemoptysis, or shortness of breath  CARDIOVASCULAR: No chest pain, dyspnea, palpitations, dizziness, syncope, paroxysmal nocturnal dyspnea, orthopnea, or arm or leg swelling  GASTROINTESTINAL: No abdominal  or epigastric pain, nausea, vomiting, hematemesis, diarrhea, constipation, melena or bright red blood.  NEUROLOGICAL: No headaches, memory loss, slurred speech, limb weakness, loss of strength, numbness, or tremors  MUSCULOSKELETAL: No joint pain or swelling, muscle, back, or extremity pain      Vital Signs Last 24 Hrs  T(C): 35.6 (05 Mar 2024 08:12), Max: 37.1 (04 Mar 2024 15:46)  T(F): 96 (05 Mar 2024 08:12), Max: 98.7 (04 Mar 2024 15:46)  HR: 76 (05 Mar 2024 08:12) (74 - 160)  BP: 135/76 (05 Mar 2024 08:12) (135/76 - 165/94)  BP(mean): 94 (05 Mar 2024 08:12) (94 - 94)  RR: 18 (05 Mar 2024 08:12) (18 - 18)  SpO2: 97% (05 Mar 2024 08:12) (95% - 98%)    Parameters below as of 05 Mar 2024 08:12  Patient On (Oxygen Delivery Method): room air        PHYSICAL EXAM:  GENERAL: In no apparent distress, well nourished, and hydrated.  NECK: Supple, No JVD   HEART: Irregular rate and rhythm; No murmurs, rubs, or gallops.  PULMONARY: Clear to auscultation and perfusion.  No rales, wheezing, or rhonchi bilaterally.  EXTREMITIES:  2+ Peripheral Pulses, no LE edema BL  NEUROLOGICAL: Grossly nonfocal      INTERPRETATION OF TELEMETRY: Atrial flutter 90-100s bpm    ECG:    I&O's Detail      LABS:                        15.7   4.13  )-----------( 157      ( 04 Mar 2024 16:20 )             45.8     03-04    140  |  103  |  12  ----------------------------<  102<H>  4.6   |  25  |  0.6<L>    Ca    9.9      04 Mar 2024 16:20    TPro  7.6  /  Alb  4.4  /  TBili  1.1  /  DBili  x   /  AST  33  /  ALT  33  /  AlkPhos  123<H>  03-04          Urinalysis Basic - ( 04 Mar 2024 16:20 )    Color: x / Appearance: x / SG: x / pH: x  Gluc: 102 mg/dL / Ketone: x  / Bili: x / Urobili: x   Blood: x / Protein: x / Nitrite: x   Leuk Esterase: x / RBC: x / WBC x   Sq Epi: x / Non Sq Epi: x / Bacteria: x      BNP  I&O's Detail    Daily Height in cm: 185.42 (04 Mar 2024 15:46)    Daily     RADIOLOGY & ADDITIONAL STUDIES: Patient is a 33y old  Male who presents with a chief complaint of Aflutter (04 Mar 2024 21:52)    HPI: Patient is a 33-year-old male history of HTN, hyperactive thyroid not on any medications has not followed up with specialist presents ED for evaluation of left-sided jaw pain. The pain started on Friday associated with left facial swelling especially after eating, then it improves on it's own. He denied any fever, chills, difficulty swallowing. No tooth pain, no gingival problems. In triage patient was found to be tachycardic to the 140s 150s EKG showed 3-1 a flutter.  Patient denies any chest pain trouble breathing or sensation of palpitations. Otherwise denies any fever, chills, headache, changes in vision, cough, congestion, n/v/d, abd pain, constipation, urinary complaints, lower extremity pain/swelling. He has chronic insomnia, weight loss, diarrhea. He had thyroid enlargement and was referred by his PCP to a specialist but he never followed up. In ED Cardizem drip were started, rate controlled then switched to oral.       PAST MEDICAL & SURGICAL HISTORY:  Hypertension      Atrial flutter      Hyperthyroidism      No significant past surgical history      PREVIOUS DIAGNOSTIC TESTING:      ECHO  FINDINGS:    STRESS  FINDINGS:    CATHETERIZATION  FINDINGS:    ELECTROPHYSIOLOGY STUDY  FINDINGS:    CAROTID ULTRASOUND:  FINDINGS    VENOUS DUPLEX SCAN:  FINDINGS:    CHEST CT PULMONARY ANGIO with IV Contrast:  FINDINGS:    MEDICATIONS  (STANDING):  enoxaparin Injectable 40 milliGRAM(s) SubCutaneous every 24 hours  metoprolol tartrate 25 milliGRAM(s) Oral every 8 hours    MEDICATIONS  (PRN):      FAMILY HISTORY: No significant hx    SOCIAL HISTORY: No smoking, ETOH or illicit drug use    Past Surgical History: see above    Allergies:  No Known Allergies      REVIEW OF SYSTEMS:  CONSTITUTIONAL: No fever, weight loss, chills, shakes, or fatigue  RESPIRATORY: No cough, wheezing, hemoptysis, or shortness of breath  CARDIOVASCULAR: No chest pain, dyspnea, palpitations, dizziness, syncope, paroxysmal nocturnal dyspnea, orthopnea, or arm or leg swelling  GASTROINTESTINAL: No abdominal  or epigastric pain, nausea, vomiting, hematemesis, diarrhea, constipation, melena or bright red blood.  NEUROLOGICAL: No headaches, memory loss, slurred speech, limb weakness, loss of strength, numbness, or tremors  MUSCULOSKELETAL: No joint pain or swelling, muscle, back, or extremity pain      Vital Signs Last 24 Hrs  T(C): 35.6 (05 Mar 2024 08:12), Max: 37.1 (04 Mar 2024 15:46)  T(F): 96 (05 Mar 2024 08:12), Max: 98.7 (04 Mar 2024 15:46)  HR: 76 (05 Mar 2024 08:12) (74 - 160)  BP: 135/76 (05 Mar 2024 08:12) (135/76 - 165/94)  BP(mean): 94 (05 Mar 2024 08:12) (94 - 94)  RR: 18 (05 Mar 2024 08:12) (18 - 18)  SpO2: 97% (05 Mar 2024 08:12) (95% - 98%)    Parameters below as of 05 Mar 2024 08:12  Patient On (Oxygen Delivery Method): room air        PHYSICAL EXAM:  GENERAL: In no apparent distress, well nourished, and hydrated.  NECK: Supple, No JVD   HEART: Irregular rate and rhythm; No murmurs, rubs, or gallops.  PULMONARY: Clear to auscultation and perfusion.  No rales, wheezing, or rhonchi bilaterally.  EXTREMITIES:  2+ Peripheral Pulses, no LE edema BL  NEUROLOGICAL: Grossly nonfocal      INTERPRETATION OF TELEMETRY: not on unit    ECG:    I&O's Detail      LABS:                        15.7   4.13  )-----------( 157      ( 04 Mar 2024 16:20 )             45.8     03-04    140  |  103  |  12  ----------------------------<  102<H>  4.6   |  25  |  0.6<L>    Ca    9.9      04 Mar 2024 16:20    TPro  7.6  /  Alb  4.4  /  TBili  1.1  /  DBili  x   /  AST  33  /  ALT  33  /  AlkPhos  123<H>  03-04          Urinalysis Basic - ( 04 Mar 2024 16:20 )    Color: x / Appearance: x / SG: x / pH: x  Gluc: 102 mg/dL / Ketone: x  / Bili: x / Urobili: x   Blood: x / Protein: x / Nitrite: x   Leuk Esterase: x / RBC: x / WBC x   Sq Epi: x / Non Sq Epi: x / Bacteria: x      BNP  I&O's Detail    Daily Height in cm: 185.42 (04 Mar 2024 15:46)    Daily     RADIOLOGY & ADDITIONAL STUDIES:

## 2024-03-05 NOTE — CONSULT NOTE ADULT - SUBJECTIVE AND OBJECTIVE BOX
HPI:  a 33-year-old male history of HTN, hyperactive thyroid not on any medications has not followed up with specialist presents ED for evaluation of left-sided jaw pain. The pain started on Friday associated with left facial swelling especially after eating, then it improves on it's own. He denied any fever, chills, difficulty swallowing. No tooth pain, no gingival problems. In triage patient was found to be tachycardic to the 140s 150s EKG showed 3-1 a flutter.  Patient denies any chest pain trouble breathing or sensation of palpitations. Otherwise denies any fever, chills, headache, changes in vision, cough, congestion, n/v/d, abd pain, constipation, urinary complaints, lower extremity pain/swelling.    He has chronic insomnia, weight loss, diarrhea. He had thyroid enlargement and was referred by his PCP to a specialist but he never followed up.    In ED Cardizem drip were started, rate controlled then switched to oral.     admitted to medicine for further management  (04 Mar 2024 21:52)      =============  Consult notes: Endocrinology consulted for hyperthyroidism. Abnormal results reviewed.       PAST MEDICAL & SURGICAL HISTORY  Hypertension    Atrial flutter    Hyperthyroidism    No significant past surgical history        FAMILY HISTORY:  FAMILY HISTORY:      SOCIAL HISTORY:  []smoker  []Alcohol  []Drug    ALLERGIES:  No Known Allergies      MEDICATIONS:  MEDICATIONS  (STANDING):  enoxaparin Injectable 40 milliGRAM(s) SubCutaneous every 24 hours  metoprolol tartrate 25 milliGRAM(s) Oral every 8 hours    MEDICATIONS  (PRN):      HOME MEDICATIONS:  Home Medications:  amLODIPine 5 mg oral tablet: 1 tab(s) orally once a day (04 Mar 2024 21:59)      VITALS:   T(F): 96 (03-05 @ 08:12), Max: 98.7 (03-04 @ 15:46)  HR: 76 (03-05 @ 08:12) (74 - 160)  BP: 135/76 (03-05 @ 08:12) (135/76 - 165/94)  BP(mean): 94 (03-05 @ 08:12) (94 - 94)  RR: 18 (03-05 @ 08:12) (18 - 18)  SpO2: 97% (03-05 @ 08:12) (95% - 98%)    I&O's Summary      REVIEW OF SYSTEMS:  CONSTITUTIONAL: No weakness, fevers or chills  EYES: No visual changes  ENT: No vertigo or throat pain   NECK: No pain or stiffness  RESPIRATORY: No cough, wheezing, hemoptysis; No shortness of breath  CARDIOVASCULAR: No chest pain or palpitations  GASTROINTESTINAL: No abdominal or epigastric pain. No nausea, vomiting, or hematemesis; No diarrhea or constipation. No melena or hematochezia.  GENITOURINARY: No Polyuria  NEUROLOGICAL:  No tremors, no Weakness or numbness  SKIN: No itching, no rashes  MSK: no joint pain    PHYSICAL EXAM:  GENERAL: Patient is awake , alert and oriented,  not in acute distress  EYES: No proptosis, no lid lag  NECK: No thyroid enlargement, no palpable nodules , no bruit  LUNGS: Clear to auscultation bilaterally   CARDIOVASCULAR: S1/S2 present, RRR , no murmurs or rubs  ABD: Soft, non-tender, non-distended, +BS  EXT: No DELVIS  SKIN: No abdominal striae  NEURO: No tremors, DTR 2+    LABS:                        15.7   4.13  )-----------( 157      ( 04 Mar 2024 16:20 )             45.8     03-04    140  |  103  |  12  ----------------------------<  102<H>  4.6   |  25  |  0.6<L>    Ca    9.9      04 Mar 2024 16:20    TPro  7.6  /  Alb  4.4  /  TBili  1.1  /  DBili  x   /  AST  33  /  ALT  33  /  AlkPhos  123<H>  03-04                TSH <0.00; Total T3 --; Free T4 --   HPI:  a 33-year-old male history of HTN, hyperactive thyroid not on any medications has not followed up with specialist presents ED for evaluation of left-sided jaw pain. The pain started on Friday associated with left facial swelling especially after eating, then it improves on it's own. He denied any fever, chills, difficulty swallowing. No tooth pain, no gingival problems. In triage patient was found to be tachycardic to the 140s 150s EKG showed 3-1 a flutter.  Patient denies any chest pain trouble breathing or sensation of palpitations. Otherwise denies any fever, chills, headache, changes in vision, cough, congestion, n/v/d, abd pain, constipation, urinary complaints, lower extremity pain/swelling.    He has chronic insomnia, weight loss, diarrhea. He had thyroid enlargement and was referred by his PCP to a specialist but he never followed up.    In ED Cardizem drip were started, rate controlled then switched to oral.     admitted to medicine for further management  (04 Mar 2024 21:52)      =============  Consult notes: Endocrinology consulted for hyperthyroidism. Abnormal results reviewed.       PAST MEDICAL & SURGICAL HISTORY  Hypertension    Atrial flutter    Hyperthyroidism    No significant past surgical history        FAMILY HISTORY:  FAMILY HISTORY:      SOCIAL HISTORY:  []smoker  []Alcohol  []Drug    ALLERGIES:  No Known Allergies      MEDICATIONS:  MEDICATIONS  (STANDING):  enoxaparin Injectable 40 milliGRAM(s) SubCutaneous every 24 hours  metoprolol tartrate 25 milliGRAM(s) Oral every 8 hours    MEDICATIONS  (PRN):      HOME MEDICATIONS:  Home Medications:  amLODIPine 5 mg oral tablet: 1 tab(s) orally once a day (04 Mar 2024 21:59)      VITALS:   T(F): 96 (03-05 @ 08:12), Max: 98.7 (03-04 @ 15:46)  HR: 76 (03-05 @ 08:12) (74 - 160)  BP: 135/76 (03-05 @ 08:12) (135/76 - 165/94)  BP(mean): 94 (03-05 @ 08:12) (94 - 94)  RR: 18 (03-05 @ 08:12) (18 - 18)  SpO2: 97% (03-05 @ 08:12) (95% - 98%)    I&O's Summary      REVIEW OF SYSTEMS:  CONSTITUTIONAL: No weakness, fevers or chills  EYES: No visual changes  ENT: No vertigo or throat pain   NECK: (+) jaw pain  RESPIRATORY: No cough, wheezing, hemoptysis; No shortness of breath  CARDIOVASCULAR: (+) palpitations  GASTROINTESTINAL: No abdominal or epigastric pain. No nausea, vomiting, or hematemesis; No diarrhea or constipation. No melena or hematochezia.  GENITOURINARY: No Polyuria  NEUROLOGICAL:  No tremors, no Weakness or numbness  SKIN: No itching, no rashes  MSK: no joint pain    PHYSICAL EXAM:  GENERAL: NAD  EYES: conjunctiva and sclera clear  ENMT: No tonsillar erythema, exudates, or enlargement; Moist mucous membranes  NECK: moderately enlarged goiter, not tender, no palpable lymphadenopathy   HEART: Normal S1 S2, irregularly irregular rhythm    RESPIRATORY: Clear to auscultation bilaterally, resonant percussion note, no added sounds   ABDOMEN: Soft, Nontender, Nondistended; Bowel sounds present  NEUROLOGY: A&Ox3, grossly intact power and sensation   EXTREMITIES:  2+ Peripheral Pulses, No clubbing, cyanosis, or edema  SKIN: warm, dry, normal color, no rash or abnormal lesions    LABS:                        15.7   4.13  )-----------( 157      ( 04 Mar 2024 16:20 )             45.8     03-04    140  |  103  |  12  ----------------------------<  102<H>  4.6   |  25  |  0.6<L>    Ca    9.9      04 Mar 2024 16:20    TPro  7.6  /  Alb  4.4  /  TBili  1.1  /  DBili  x   /  AST  33  /  ALT  33  /  AlkPhos  123<H>  03-04                TSH <0.00; Total T3 --; Free T4 --   HPI:  a 33-year-old male history of HTN, hyperactive thyroid not on any medications has not followed up with specialist presents ED for evaluation of left-sided jaw pain. The pain started on Friday associated with left facial swelling especially after eating, then it improves on it's own. He denied any fever, chills, difficulty swallowing. No tooth pain, no gingival problems. In triage patient was found to be tachycardic to the 140s 150s EKG showed 3-1 a flutter.  Patient denies any chest pain trouble breathing or sensation of palpitations. Otherwise denies any fever, chills, headache, changes in vision, cough, congestion, n/v/d, abd pain, constipation, urinary complaints, lower extremity pain/swelling.    He has chronic insomnia, weight loss, diarrhea. He had thyroid enlargement and was referred by his PCP to a specialist but he never followed up.    In ED Cardizem drip were started, rate controlled then switched to oral.     admitted to medicine for further management  (04 Mar 2024 21:52)      =============  Consult notes: Endocrinology consulted for hyperthyroidism. Abnormal results reviewed.     PATIENT ELOPED.       PAST MEDICAL & SURGICAL HISTORY  Hypertension    Atrial flutter    Hyperthyroidism    No significant past surgical history        FAMILY HISTORY:  FAMILY HISTORY:      SOCIAL HISTORY:  []smoker  []Alcohol  []Drug    ALLERGIES:  No Known Allergies      MEDICATIONS:  MEDICATIONS  (STANDING):  enoxaparin Injectable 40 milliGRAM(s) SubCutaneous every 24 hours  metoprolol tartrate 25 milliGRAM(s) Oral every 8 hours    MEDICATIONS  (PRN):      HOME MEDICATIONS:  Home Medications:  amLODIPine 5 mg oral tablet: 1 tab(s) orally once a day (04 Mar 2024 21:59)      VITALS:   T(F): 96 (03-05 @ 08:12), Max: 98.7 (03-04 @ 15:46)  HR: 76 (03-05 @ 08:12) (74 - 160)  BP: 135/76 (03-05 @ 08:12) (135/76 - 165/94)  BP(mean): 94 (03-05 @ 08:12) (94 - 94)  RR: 18 (03-05 @ 08:12) (18 - 18)  SpO2: 97% (03-05 @ 08:12) (95% - 98%)    I&O's Summary      REVIEW OF SYSTEMS:  CONSTITUTIONAL: No weakness, fevers or chills  EYES: No visual changes  ENT: No vertigo or throat pain   NECK: (+) jaw pain  RESPIRATORY: No cough, wheezing, hemoptysis; No shortness of breath  CARDIOVASCULAR: (+) palpitations  GASTROINTESTINAL: No abdominal or epigastric pain. No nausea, vomiting, or hematemesis; No diarrhea or constipation. No melena or hematochezia.  GENITOURINARY: No Polyuria  NEUROLOGICAL:  No tremors, no Weakness or numbness  SKIN: No itching, no rashes  MSK: no joint pain    PHYSICAL EXAM:  - not examined. patient eloped.     LABS:                        15.7   4.13  )-----------( 157      ( 04 Mar 2024 16:20 )             45.8     03-04    140  |  103  |  12  ----------------------------<  102<H>  4.6   |  25  |  0.6<L>    Ca    9.9      04 Mar 2024 16:20    TPro  7.6  /  Alb  4.4  /  TBili  1.1  /  DBili  x   /  AST  33  /  ALT  33  /  AlkPhos  123<H>  03-04                TSH <0.00; Total T3 --; Free T4 --

## 2024-03-05 NOTE — PATIENT PROFILE ADULT - FUNCTIONAL ASSESSMENT - BASIC MOBILITY 2.
1. The patient was informed of the current guidelines for colonoscopy.  Typically, it is recommended that patients begin screening colonoscopies at age 45-50. Patient's with a family history of colon cancer should begin screening protocols earlier.  2. I recommend beginning a High fiber diet (30 g of  fiber daily) that is rich in whole grain oats, raw leafy greens, fruits and vegetables  3.  Benefiber 1 tablespoon with 8 ounces of water twice a day  4. Increase water intake to at least 64 ounces water daily  5. Recommend colonoscopy with biopsy for diagnostic purposes with MAC. The patient was informed of the risks and benefits of colonoscopy. The risks include but are not limited to pain, bleeding, damage to surrounding tissues, need for emergent surgery, intestinal perforation, recurrence, prolonged wound healing, cardiac complications, pulmonary complications, and anesthetic complications  6. A prescription for the bowel prep was called into the pharmacy (Suprep split dose to begin the day prior to the procedure)  7. The patient is to call the office with thier desired colonoscopy date  8. The patient was informed that they will need an escort home following the colonoscopy     Juan Roman MD FACS FASCRS  Colon and Rectal Surgery   4 = No assist / stand by assistance

## 2024-03-05 NOTE — CONSULT NOTE ADULT - ASSESSMENT
33-year-old male history of HTN, hyperactive thyroid not on any medications has not followed up with specialist presents ED for evaluation of left-sided jaw pain. Consulted for hyperthyroidism    #Hyperthyroidism  - please check Free T3,T4  - note incomplete. recs to follow. 33-year-old male history of HTN, hyperactive thyroid not on any medications has not followed up with specialist presents ED for evaluation of left-sided jaw pain. Consulted for hyperthyroidism    #Hyperthyroidism  - please check Free T3,T4, TRAb  - cont BB  - note incomplete. recs to follow. 33-year-old male history of HTN, hyperactive thyroid not on any medications has not followed up with specialist presents ED for evaluation of left-sided jaw pain. Consulted for hyperthyroidism    #Hyperthyroidism  - please check Free T3,T4, TRAb  - cont BB  - Patient eloped prior to examination.  33-year-old male history of HTN, hyperactive thyroid not on any medications has not followed up with specialist presents ED for evaluation of left-sided jaw pain. Consulted for hyperthyroidism    #Hyperthyroidism  - please check Free T3,T4, TRAb  - cont BB, need to start methimazole   - Patient eloped prior to be seen or examination, phone call attempts no answer

## 2024-03-05 NOTE — PATIENT PROFILE ADULT - FALL HARM RISK - UNIVERSAL INTERVENTIONS
Bed in lowest position, wheels locked, appropriate side rails in place/Call bell, personal items and telephone in reach/Instruct patient to call for assistance before getting out of bed or chair/Non-slip footwear when patient is out of bed/[place] to call system/Physically safe environment - no spills, clutter or unnecessary equipment/Purposeful Proactive Rounding/Room/bathroom lighting operational, light cord in reach

## 2024-03-13 DIAGNOSIS — I10 ESSENTIAL (PRIMARY) HYPERTENSION: ICD-10-CM

## 2024-03-13 DIAGNOSIS — E05.00 THYROTOXICOSIS WITH DIFFUSE GOITER WITHOUT THYROTOXIC CRISIS OR STORM: ICD-10-CM

## 2024-03-13 DIAGNOSIS — G47.00 INSOMNIA, UNSPECIFIED: ICD-10-CM

## 2024-03-13 DIAGNOSIS — I48.92 UNSPECIFIED ATRIAL FLUTTER: ICD-10-CM

## 2024-03-13 DIAGNOSIS — R68.84 JAW PAIN: ICD-10-CM

## 2024-08-09 PROBLEM — E05.90 THYROTOXICOSIS, UNSPECIFIED WITHOUT THYROTOXIC CRISIS OR STORM: Chronic | Status: ACTIVE | Noted: 2024-03-04

## 2024-08-09 PROBLEM — I10 ESSENTIAL (PRIMARY) HYPERTENSION: Chronic | Status: ACTIVE | Noted: 2024-03-04

## 2024-08-09 PROBLEM — I48.92 UNSPECIFIED ATRIAL FLUTTER: Chronic | Status: ACTIVE | Noted: 2024-03-04

## 2024-09-05 ENCOUNTER — EMERGENCY (EMERGENCY)
Facility: HOSPITAL | Age: 34
LOS: 0 days | Discharge: LEFT BEFORE DISCHARGE | End: 2024-09-05
Attending: EMERGENCY MEDICINE
Payer: MEDICAID

## 2024-09-05 VITALS
DIASTOLIC BLOOD PRESSURE: 79 MMHG | WEIGHT: 179.9 LBS | HEART RATE: 108 BPM | OXYGEN SATURATION: 99 % | SYSTOLIC BLOOD PRESSURE: 133 MMHG | TEMPERATURE: 99 F | HEIGHT: 73 IN | RESPIRATION RATE: 16 BRPM

## 2024-09-05 DIAGNOSIS — M25.532 PAIN IN LEFT WRIST: ICD-10-CM
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
## 2024-09-05 DIAGNOSIS — Z53.21 PROCEDURE AND TREATMENT NOT CARRIED OUT DUE TO PATIENT LEAVING PRIOR TO BEING SEEN BY HEALTH CARE PROVIDER: ICD-10-CM
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
## 2024-09-05 PROCEDURE — L9991: CPT
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
## 2024-09-05 NOTE — ED ADULT NURSE NOTE - NS ED NURSE DISCH DISPOSITION
Called back regarding referral to confirm interest in program  No answer.  Lvm providing office phone number.    Left without being seen (saw a nurse but never saw a physician or midlevel provider)

## 2024-09-05 NOTE — ED ADULT NURSE NOTE - NSFALLUNIVINTERV_ED_ALL_ED
Bed/Stretcher in lowest position, wheels locked, appropriate side rails in place/Call bell, personal items and telephone in reach/Instruct patient to call for assistance before getting out of bed/chair/stretcher/Non-slip footwear applied when patient is off stretcher/[place] to call system/Physically safe environment - no spills, clutter or unnecessary equipment/Purposeful proactive rounding/Room/bathroom lighting operational, light cord in reach

## 2024-11-19 ENCOUNTER — EMERGENCY (EMERGENCY)
Facility: HOSPITAL | Age: 34
LOS: 0 days | Discharge: HOME | End: 2024-11-19
Attending: EMERGENCY MEDICINE
Payer: MEDICAID

## 2024-11-19 VITALS
RESPIRATION RATE: 18 BRPM | TEMPERATURE: 98 F | OXYGEN SATURATION: 97 % | DIASTOLIC BLOOD PRESSURE: 88 MMHG | SYSTOLIC BLOOD PRESSURE: 153 MMHG | WEIGHT: 175.05 LBS | HEART RATE: 93 BPM | HEIGHT: 73 IN

## 2024-11-19 DIAGNOSIS — L02.31 CUTANEOUS ABSCESS OF BUTTOCK: ICD-10-CM

## 2024-11-19 DIAGNOSIS — I10 ESSENTIAL (PRIMARY) HYPERTENSION: ICD-10-CM

## 2024-11-19 DIAGNOSIS — I48.91 UNSPECIFIED ATRIAL FIBRILLATION: ICD-10-CM

## 2024-11-19 DIAGNOSIS — Z79.01 LONG TERM (CURRENT) USE OF ANTICOAGULANTS: ICD-10-CM

## 2024-11-19 PROCEDURE — 99283 EMERGENCY DEPT VISIT LOW MDM: CPT | Mod: 25

## 2024-11-19 PROCEDURE — 10060 I&D ABSCESS SIMPLE/SINGLE: CPT

## 2024-11-19 RX ORDER — IBUPROFEN 200 MG
600 TABLET ORAL ONCE
Refills: 0 | Status: COMPLETED | OUTPATIENT
Start: 2024-11-19 | End: 2024-11-19

## 2024-11-19 RX ORDER — CEPHALEXIN 125 MG/5ML
1 SUSPENSION, RECONSTITUTED, ORAL (ML) ORAL
Qty: 28 | Refills: 0
Start: 2024-11-19 | End: 2024-11-25

## 2024-11-19 RX ADMIN — Medication 600 MILLIGRAM(S): at 02:40

## 2024-11-19 NOTE — ED PROVIDER NOTE - OBJECTIVE STATEMENT
34-year-old male with past medical history of hypertension, A-fib on Eliquis presenting to the ED for a abscess.  Patient states for the past 2 days he has had pain and swelling in his left buttocks area.  Right prior to arrival patient noticed fluid draining from the left buttocks region.  No other concerns at this time.  No nausea, vomiting, fevers, chills, chest pain, shortness of breath, abdominal pain

## 2024-11-19 NOTE — ED PROVIDER NOTE - CARE PLAN
Principal Discharge DX:	Left buttock abscess  Assessment and plan of treatment:	cutaneous abscess  I and d  dc outpt follow up   1

## 2024-11-19 NOTE — ED PROVIDER NOTE - PATIENT PORTAL LINK FT
You can access the FollowMyHealth Patient Portal offered by [facility] by registering at the following website: [email or web address]. By joining [employer]’s FollowMyHealth portal, you will also be able to view your health information using other applications (apps) compatible with our system.

## 2024-11-19 NOTE — ED PROVIDER NOTE - NSFOLLOWUPINSTRUCTIONS_ED_ALL_ED_FT
Abscess    An abscess is an infected area that contains a collection of pus and debris. It can occur in almost any part of the body and occurs when the tissue gets infection. Symptoms include a painful mass that is red, warm, tender that might break open and HAVE drainage. If your health care provider gave you antibiotics make sure to take the full course and do not stop even if feeling better.     SEEK IMMEDIATE MEDICAL CARE IF YOU HAVE ANY OF THE FOLLOWING SYMPTOMS: chills, fever, muscle aches, or red streaking from the area.    Follow up with employee health in the next 1-2 days. Please follow up with your primary care doctor.     Abscess    An abscess is an infected area that contains a collection of pus and debris. It can occur in almost any part of the body and occurs when the tissue gets infection. Symptoms include a painful mass that is red, warm, tender that might break open and HAVE drainage. If your health care provider gave you antibiotics make sure to take the full course and do not stop even if feeling better.     SEEK IMMEDIATE MEDICAL CARE IF YOU HAVE ANY OF THE FOLLOWING SYMPTOMS: chills, fever, muscle aches, or red streaking from the area.    Follow up with employee health in the next 1-2 days.

## 2024-11-19 NOTE — ED PROVIDER NOTE - ATTENDING CONTRIBUTION TO CARE
I have personally seen and examined this patient.  I have fully participated in the care of this patient. I have reviewed all pertinent clinical information, including history, physical exam, plan and the resident phycisian's note and agree except as noted.    cutaneous abscess. no sirs.  over mid L glute. no anal/rectal involvement.

## 2024-11-19 NOTE — ED PROVIDER NOTE - CARE PROVIDER_API CALL
[facility]  Family Medicine  [address] [place] [id number]  Phone: [phone]  Fax: [phone]  Follow Up Time: Routine

## 2024-11-19 NOTE — ED PROVIDER NOTE - PHYSICAL EXAMINATION
CONSTITUTIONAL: well-appearing, in NAD  SKIN: Warm dry, normal skin turgor  HEAD: NCAT  EYES: EOMI, PERRLA, no scleral icterus, conjunctiva pink  ENT: normal pharynx with no erythema or exudates  NECK: Supple; non tender. Full ROM.  BUTTOCK: draining abscess on L mid buttocks. +pus/blood, induration present surrounding wound.   EXT: Full ROM, no bony tenderness, no pedal edema, no calf tenderness  NEURO: normal motor. normal sensory. Normal gait.  PSYCH: Cooperative, appropriate.

## 2025-07-09 NOTE — PATIENT PROFILE ADULT - FALL HARM RISK - PATIENT NEEDS ASSISTANCE
[facility]    Subjective:       Patient ID: Chinyere Crockett is a 51 y.o. female.    Chief Complaint: Annual Exam      History was obtained from the patient and supplemented through chart review.  This patient is known to me.    HPI:    Patient is a 51 y.o. female w/ History reviewed. No pertinent past medical history.      History of Present Illness    CHIEF COMPLAINT:  Ms. Crockett presents today for annual exam and follow up regarding 's overseas deployment    HEADACHES/MIGRAINES:  She reports severe headaches persisting for 2.5 weeks with a migratory pain pattern, characterized by shifting dull aches in different locations. She has a history of seasonal migraines occurring every spring and fall, lasting 3-5 weeks, with periods of extreme light and sound sensitivity. She is currently managing symptoms with Excedrin migraine 500 mg, which provides minimal relief. She previously consulted a headache specialist at [facility]. She wears her hair down constantly due to headache sensitivity and describes the current headache as particularly challenging. She denies identifying a specific trigger for the current headache episode and expresses interest in alternative migraine management strategies.    MUSCULOSKELETAL:  She reports ongoing hip and lower back issues with an audible popping sensation in the left lower back. She describes the popping as ranging from a mild realignment feeling to a sudden, sharp shock that causes her to stop moving. Pain is localized to the left paraspinal area and can be triggered by twisting or bending. The sensation can be so pronounced that her  can sometimes hear the popping. She has a history of mild scoliosis noted in 2021 and completed a 6-week restorative physical therapy program that same year. She received trochanteric bursitis injections in April. She denies radiating pain but describes the popping as a shocking sensation that impacts her movement  and comfort.    BREAST ISSUES:  She reports significant breast-related issues impacting daily functioning. She describes open sores along bra lines that developed during walking activities, requiring extensive wound care including cleaning with hydrogen peroxide and applying bandages. She is unable to wear regular bras due to breast size and discomfort. She experiences functional limitations related to breast size and is interested in exploring breast reduction as a potential solution. She notes bilateral pendulous breast configuration that causes significant physical challenges.    CURRENT MEDICATIONS:  She takes Flexeril nightly for back pain and muscle spasms. She is currently on Lexapro and experiencing heat sensitivity as a side effect, which includes random GI symptoms such as gas.    PAST MEDICAL HISTORY:  She has a history of stomach ulcer during college years, which has resolved.    FAMILY HISTORY:  Her mother has a history of spinal degenerative issues requiring surgical intervention, including spine section removal, which resulted in wheelchair use for approximately 1.5 years. Her mother also has a history of significant breast cancer and subsequently developed lung tumors. She notes uncertainty about potential genetic or hereditary components of her mother's medical conditions.    IMMUNIZATIONS:  She received multiple vaccinations in September including influenza vaccine on 9/16, COVID vaccine o  n 9/23, and first dose of shingles vaccine during the same timeframe. She acknowledges intent to complete the shingles vaccine series and also received tetanus, pneumonia, and other vaccines at [facility] on a single visit.    ROS:  General: positive excessive sweating  Endocrine: positive heat intolerance  Genitourinary: negative incontinence  Musculoskeletal: positive back pain, positive pain with movement  Skin: positive sores  Neurological: positive migraines               Restorative functional program back  "in 2021      Lab Results   Component Value Date    HGBA1C 5.0 07/11/2024    HGBA1C 4.9 07/11/2023    HGBA1C 5.0 08/19/2021     Lab Results   Component Value Date    LDLCALC 110.6 07/11/2024    CREATININE 0.8 07/11/2024         Wt Readings from Last 15 Encounters:   07/09/25 104.1 kg (229 lb 8 oz)   04/15/25 103.9 kg (229 lb)   04/03/25 105.5 kg (232 lb 9.4 oz)   03/28/25 104 kg (229 lb 4.5 oz)   03/26/25 103.5 kg (228 lb 2.8 oz)   03/07/25 103.5 kg (228 lb 2.8 oz)   12/27/24 105.2 kg (231 lb 14.8 oz)   12/12/24 102.5 kg (226 lb)   11/13/24 102.5 kg (225 lb 15.5 oz)   10/24/24 101 kg (222 lb 10.6 oz)   07/18/24 101.3 kg (223 lb 5.2 oz)   02/20/24 101.6 kg (224 lb)   01/12/24 103 kg (227 lb 1.2 oz)   01/11/24 104.6 kg (230 lb 9.6 oz)   11/15/23 101.6 kg (223 lb 15.8 oz)       Medical History  History reviewed. No pertinent past medical history.    Review of Systems   Constitutional:  Negative for fever.   HENT:  Negative for trouble swallowing.    Respiratory:  Negative for shortness of breath.    Cardiovascular:  Negative for chest pain.   Gastrointestinal:  Negative for constipation, diarrhea, nausea and vomiting.   Endocrine: Positive for heat intolerance.   Musculoskeletal:  Positive for arthralgias and back pain. Negative for gait problem.   Neurological:  Positive for headaches. Negative for dizziness and seizures.   Psychiatric/Behavioral:  Negative for hallucinations.          Surgical hx, family hx, social hx   Have been reviewed    Current Medications[1]    Objective:        Body mass index is 37.04 kg/m².  Vitals:    07/09/25 1014   BP: (P) 118/60   Pulse: 110   SpO2: 97%   Weight: 104.1 kg (229 lb 8 oz)   Height: 5' 6" (1.676 m)   PainSc:   3   PainLoc: Back     Physical Exam  Vitals and nursing note reviewed.   Constitutional:       General: She is not in acute distress.     Appearance: Normal appearance. She is obese. She is not ill-appearing.   HENT:      Head: Normocephalic and atraumatic.      Right " Ear: Tympanic membrane and ear canal normal. There is no impacted cerumen.      Left Ear: Tympanic membrane and ear canal normal. There is no impacted cerumen.   Eyes:      General: No scleral icterus.  Cardiovascular:      Rate and Rhythm: Normal rate and regular rhythm.      Heart sounds: Normal heart sounds.   Pulmonary:      Effort: Pulmonary effort is normal.   Musculoskeletal:         General: No deformity.      Comments: Bra straps indentations even with thick/wide straps designed specifically for large breasts    Neurological:      Mental Status: She is alert and oriented to person, place, and time.   Psychiatric:         Behavior: Behavior normal.               Lab Results   Component Value Date    WBC 6.87 07/11/2024    HGB 13.8 07/11/2024    HCT 42.6 07/11/2024     07/11/2024    CHOL 199 07/11/2024    TRIG 97 07/11/2024    HDL 69 07/11/2024    ALT 19 07/11/2024    AST 23 07/11/2024     07/11/2024    K 4.1 07/11/2024     07/11/2024    CREATININE 0.8 07/11/2024    BUN 10 07/11/2024    CO2 27 07/11/2024    TSH 1.996 07/11/2024    HGBA1C 5.0 07/11/2024       The 10-year ASCVD risk score (Danya GARCIA, et al., 2019) is: 0.9%    Values used to calculate the score:      Age: 51 years      Sex: Female      Is Non- : No      Diabetic: No      Tobacco smoker: No      Systolic Blood Pressure: 118 mmHg      Is BP treated: No      HDL Cholesterol: 69 mg/dL      Total Cholesterol: 199 mg/dL    (Imaging have been independently reviewed)  mammo    Assessment:         1. Annual physical exam    2. Preventative health care    3. Hip pain, bilateral    4. Greater trochanteric bursitis of both hips    5. Acute left-sided low back pain without sciatica    6. Left hip pain    7. Migraine without aura and without status migrainosus, not intractable    8. Decreased functional activity tolerance    9. Encounter for screening mammogram for malignant neoplasm of breast    10. Bilateral  pendulous breasts    11. Obesity (BMI 30.0-34.9)          Plan:     Chinyere was seen today for annual exam.    Diagnoses and all orders for this visit:    Annual physical exam    Preventative health care  -     Comprehensive Metabolic Panel; Future  -     Lipid Panel; Future  -     TSH; Future  -     CBC Auto Differential; Future  -     Hemoglobin A1C; Future    Hip pain, bilateral    Greater trochanteric bursitis of both hips    Acute left-sided low back pain without sciatica  -     X-Ray Lumbar Spine 5 View; Future  -     Ambulatory referral/consult to Orthopedics; Future    Left hip pain  -     X-Ray Hip 2 or 3 views Left with Pelvis when performed; Future    Migraine without aura and without status migrainosus, not intractable    Decreased functional activity tolerance    Encounter for screening mammogram for malignant neoplasm of breast  -     Mammo Digital Screening Bilat w/ Randy (XPD); Future    Bilateral pendulous breasts  -     Ambulatory referral/consult to Plastic Surgery; Future    Obesity (BMI 30.0-34.9)  -     tirzepatide, weight loss, (ZEPBOUND) 2.5 mg/0.5 mL PnIj; Inject 2.5 mg into the skin every 7 days.        Assessment & Plan    IMPRESSION:  1. Considered orthopedic evaluation for ongoing hip and lower back issues, including audible popping and pain.  2. Recommend XR Lumbar Spine and Hips to assess for degenerative changes and rule out masses before pursuing further treatment.  3. Evaluated chronic migraine headaches; noted ineffectiveness of previous treatments including Excedrin and triptans.  4. Considered breast reduction surgery as potential intervention for back issues and skin irritation.  5. Assessed eligibility for weight loss medication, noting no contraindications such as family history of medullary thyroid cancer or personal history of pancreatitis.    CHRONIC MIGRAINE:   Ms. Crockett reports severe migratory headaches for the past 2.5 weeks with spikes and dull aches.   Has history of  migraines every spring and fall for about 5 weeks, but denies them regularly for about 1.5 years.   Current treatment with Excedrin migraine with caffeine has not been effective.   Recommend trying Nurtec again for migraine treatment, despite previous insurance coverage issues.    LUMBAR RADICULOPATHY:   Ms. Crockett reports left lower back pain with popping sensation, sometimes sharp and painful.   Reviewed previous imaging from 2020 showing degenerative disc changes with arthritis.   Ordered XR Lumbar Spine to assess current condition.   Explained radiographs will likely show some arthritis but will help rule out more serious conditions.   Referred to orthopedics for evaluation.   Ms. Crockett to follow up after imaging results are available.    SCOLIOSIS:   Ms. Crockett has mild scoliosis documented in 2021.   Recommend physical therapy program for management.    TROCHANTERIC BURSITIS:   Ms. Crockett received injections for trochanteric bursitis in April but reports worsening symptoms with popping sensation and pain.   Ordered XR Hips to assess current condition.   Explained radiographs will likely show some arthritis but will help rule out more serious conditions.   Referred to orthopedics for evaluation.   Ms. Crockett to follow up after imaging results are available.    BREAST HYPERTROPHY:   Ms. Crockett reports back issues and open sores due to bra line slits.   Observed indentations from bra straps and noted patient does not wear regular bras due to discomfort.   Referred to plastic surgery for breast reduction consultation to alleviate back pain and sores.    CHRONIC SKIN ULCER:   Ms. Crokcett reports open sores under bra line due to walking.   Management addressed under breast hypertrophy with referral to plastic surgery for breast reduction consultation.    MUSCLE SPASM:   Continued prescription of Flexeril for nightly use to manage muscle spasms.    PERSONAL HISTORY OF DIGESTIVE SYSTEM DISEASE:    Documented history of stomach ulcer during college years.    FAMILY HISTORY OF BREAST CANCER:   Noted family history of mother having massive breast cancer.   Scheduled mammogram in fall/winter for annual screening.    FAMILY HISTORY OF LUNG CANCER:   Documented family history of mother having pulmonary neoplasm.    ADVERSE EFFECT OF MEDICATION:   Ms. Crockett reports increased sensitivity to heat and excessive diaphoresis, recognized as side effects of Lexapro.   Discussed gradual dosing approach, starting at a low dose with potential increase over time.    OTHER MANAGEMENT:   Discussed shingles vaccine series consisting of 2 doses, with second dose completing lifelong protection.   Ordered weight loss medication (Ozempic/Wegovy) pending insurance coverage, to start at lowest dose if approved.   Ordered fasting labs.             All of your core healthy metrics are met.      Follow up in about 6 months (around 1/9/2026). or sooner prn            This note was generated with the assistance of ambient listening technology. Verbal consent was obtained by the patient and accompanying visitor(s) for the recording of patient appointment to facilitate this note. I attest to having reviewed and edited the generated note for accuracy, though some syntax or spelling errors may persist. Please contact the author of this note for any clarification.      Visit today is associated with current or anticipated ongoing medical care related to this patient's single serious condition/complex condition of anxiety, obesity, social isolation. The patient will return to see me as these issues will be followed longitudinally.      All medications were reviewed including potential side effects and risks/benefits.  Pt was counseled to call back if anything worsens or if questions arise.        Harrison Lanza MD  Family Medicine  [facility]  [address]  [address]  [place] [address]  Phone [phone]  Fax  [phone]         [1]   Current Outpatient Medications:     cyclobenzaprine (FLEXERIL) 10 MG tablet, TAKE 1 TABLET(10 MG) BY MOUTH TWICE DAILY AS NEEDED FOR MUSCLE SPASMS, Disp: 60 tablet, Rfl: 5    diclofenac (VOLTAREN) 50 MG EC tablet, TAKE 1 TABLET(50 MG) BY MOUTH TWICE DAILY AS NEEDED FOR PAIN OR SWELLING, Disp: 60 tablet, Rfl: 5    EScitalopram oxalate (LEXAPRO) 20 MG tablet, Take 1 tablet (20 mg total) by mouth once daily., Disp: 90 tablet, Rfl: 1    tirzepatide, weight loss, (ZEPBOUND) 2.5 mg/0.5 mL PnIj, Inject 2.5 mg into the skin every 7 days., Disp: 4 Pen, Rfl: 1    urea (CARMOL) 40 % Crea, Apply topically 2 (two) times daily., Disp: 1 each, Rfl: 11    Current Facility-Administered Medications:     triamcinolone acetonide injection 40 mg, 40 mg, INTRABURSAL, 1 time in Clinic/HOD, Daryl Zazueta, DO    triamcinolone acetonide injection 40 mg, 40 mg, INTRABURSAL, 1 time in Clinic/HOD, Daryl Zazueta, DO    Facility-Administered Medications Ordered in Other Visits:     0.9%  NaCl infusion, 500 mL, Intravenous, Continuous, Domingo Jernigan MD     No assistance needed